# Patient Record
Sex: FEMALE | Race: WHITE | Employment: OTHER
[De-identification: names, ages, dates, MRNs, and addresses within clinical notes are randomized per-mention and may not be internally consistent; named-entity substitution may affect disease eponyms.]

---

## 2017-02-09 RX ORDER — GLUCOSAMINE SULFATE 1500 MG
1000 POWDER IN PACKET (EA) ORAL DAILY
COMMUNITY

## 2017-02-09 RX ORDER — FAMOTIDINE 20 MG/1
20 TABLET, FILM COATED ORAL 2 TIMES DAILY
COMMUNITY
End: 2017-07-12

## 2017-02-09 RX ORDER — LEVOTHYROXINE SODIUM 100 UG/1
100 TABLET ORAL
COMMUNITY

## 2017-02-09 RX ORDER — IBUPROFEN 200 MG
200 TABLET ORAL AS NEEDED
COMMUNITY
End: 2017-07-12

## 2017-02-09 RX ORDER — PREDNISONE 50 MG/1
60 TABLET ORAL DAILY
COMMUNITY
End: 2017-07-12

## 2017-02-09 RX ORDER — ASPIRIN 81 MG/1
81 TABLET ORAL DAILY
COMMUNITY
End: 2017-07-12 | Stop reason: SDUPTHER

## 2017-02-09 RX ORDER — AMLODIPINE BESYLATE 2.5 MG/1
2.5 TABLET ORAL DAILY
COMMUNITY
End: 2017-07-12

## 2017-02-09 RX ORDER — DM/PE/ACETAMINOPHEN/CHLORPHENR 10-5-325-2
TABLET, SEQUENTIAL ORAL
COMMUNITY

## 2017-02-10 ENCOUNTER — SURGERY (OUTPATIENT)
Age: 77
End: 2017-02-10

## 2017-02-10 ENCOUNTER — HOSPITAL ENCOUNTER (OUTPATIENT)
Age: 77
Setting detail: OUTPATIENT SURGERY
Discharge: HOME | End: 2017-02-10
Attending: SURGERY | Admitting: SURGERY
Payer: MEDICARE

## 2017-02-10 VITALS
OXYGEN SATURATION: 98 % | TEMPERATURE: 97.8 F | DIASTOLIC BLOOD PRESSURE: 80 MMHG | RESPIRATION RATE: 15 BRPM | HEART RATE: 84 BPM | BODY MASS INDEX: 22.18 KG/M2 | SYSTOLIC BLOOD PRESSURE: 144 MMHG | HEIGHT: 66 IN | WEIGHT: 138 LBS

## 2017-02-10 PROCEDURE — 74011000250 HC RX REV CODE- 250: Performed by: SURGERY

## 2017-02-10 PROCEDURE — 77030010507 HC ADH SKN DERMBND J&J -B: Performed by: SURGERY

## 2017-02-10 PROCEDURE — 77030002933 HC SUT MCRYL J&J -A: Performed by: SURGERY

## 2017-02-10 PROCEDURE — 76210000063 HC OR PH I REC FIRST 0.5 HR: Performed by: SURGERY

## 2017-02-10 PROCEDURE — 77030018836 HC SOL IRR NACL ICUM -A: Performed by: SURGERY

## 2017-02-10 PROCEDURE — 77030010938 HC CLP LIG TELE -A: Performed by: SURGERY

## 2017-02-10 PROCEDURE — 76210000020 HC REC RM PH II FIRST 0.5 HR: Performed by: SURGERY

## 2017-02-10 PROCEDURE — 77030011640 HC PAD GRND REM COVD -A: Performed by: SURGERY

## 2017-02-10 PROCEDURE — 77030011283 HC ELECTRD NDL COVD -A: Performed by: SURGERY

## 2017-02-10 PROCEDURE — 88305 TISSUE EXAM BY PATHOLOGIST: CPT | Performed by: SURGERY

## 2017-02-10 PROCEDURE — 76010000154 HC OR TIME FIRST 0.5 HR: Performed by: SURGERY

## 2017-02-10 RX ADMIN — LIDOCAINE HYDROCHLORIDE: 10 INJECTION, SOLUTION EPIDURAL; INFILTRATION; INTRACAUDAL; PERINEURAL at 08:31

## 2017-02-10 RX ADMIN — LIDOCAINE HYDROCHLORIDE 6 ML: 10 INJECTION, SOLUTION EPIDURAL; INFILTRATION; INTRACAUDAL; PERINEURAL at 09:05

## 2017-02-10 NOTE — IP AVS SNAPSHOT
Summary of Care Report The Summary of Care report has been created to help improve care coordination. Users with access to [employer] or [address] (Web-based application) may access additional patient information including the Discharge Summary. If you are not currently a [address] user and need more information, please call the number listed below in the [place] [address] section and ask to be connected with Medical Records. Facility Information Name Address Phone [address] [phone] Patient Information Patient Name Sex GRISELDA Ohara ([id number]) Female 1940 Discharge Information Admitting Provider Service Area Unit Elena Helton MD / [phone] [address] [place] [address] / [phone] Discharge Provider Discharge Date/Time Discharge Disposition Destination (none) 2/10/2017 09:30 (Pending) AHR (none) Patient Language Language ENGLISH [13] You are allergic to the following Allergen Reactions Ciprofloxacin Myalgia Quinolones Other (comments) EYES CAN'T FOCUS, PATIENT CANT WALK Current Discharge Medication List  
  
CONTINUE these medications which have NOT CHANGED Dose & Instructions Dispensing Information Comments ADVIL 200 mg tablet Generic drug:  ibuprofen Dose:  200 mg Take 200 mg by mouth as needed for Pain. Refills:  0  
   
 ASHWAGANDHA ROOT EXTRACT(BULK) Dose:  1 Cap 1 Cap by Does Not Apply route daily. 300MG DAILY Refills:  0  
   
 aspirin delayed-release 81 mg tablet Dose:  81 mg Take 81 mg by mouth daily. Refills:  0  
   
 L-Mfolate-B6 Phos-Methyl-B12 3-35-2 mg Tab tab Commonly known as:  Radha Akin Dose:  1 Tab Take 1 Tab by mouth daily. 1000MCG DAILY PO Refills:  0 NORVASC 2.5 mg tablet Generic drug:  amLODIPine Dose:  2.5 mg Take 2.5 mg by mouth daily. Refills:  0 Omega-3 Fatty Acids 60- mg Cpdr  
 Dose:  1 Tab Take 1 Tab by mouth daily. Refills:  0 PEPCID 20 mg tablet Generic drug:  famotidine Dose:  20 mg Take 20 mg by mouth two (2) times a day. Refills:  0  
   
 predniSONE 50 mg tablet Commonly known as:  Alinda Katherin Dose:  60 mg Take 60 mg by mouth daily. PER  [address]. [address] [place] [address], [address] [place] [address] Sylvia Helton Refills:  0 PRESERVISION AREDS 2 PO Take  by mouth. Refills:  0  
   
 SYNTHROID 100 mcg tablet Generic drug:  levothyroxine Dose:  100 mcg Take 100 mcg by mouth Daily (before breakfast). Refills:  0  
   
 VITAMIN D3 1,000 unit Cap Generic drug:  cholecalciferol Dose:  1000 Units Take 1,000 Units by mouth daily. Refills:  0 ASK your doctor about these medications Dose & Instructions Dispensing Information Comments GLUCOSAMINE RELIEF 1,000 mg Tab Generic drug:  glucosamine Take  by mouth. WITH CHONDROINTIN 1500MG DAILY Refills:  0 Surgery Information ID Date/Time Status Primary Surgeon All Procedures Location [id number] 2/10/2017 0830 Unposted Miguel Ángel Turcios MD LEFT TEMPORAL ARTERY BIOPSY [facility] MAIN OR Follow-up Information Follow up With Details Comments Contact Info Nat Morgan MD   [address] [address] 
[phone] Discharge Instructions Temporal Arteritis: Care Instructions Your Care Instructions Temporal arteritis is an inflammation of blood vessels leading to your head and eyes. It usually affects people older than 50. It is more common in women. This condition is also called giant cell arteritis. Temporal arteritis causes a dull, throbbing headache on one side of the head around the eye or near the temple.  Sometimes the pain feels like stabbing or burning. It may also cause jaw pain and vision loss. Temporal arteritis is treated right away to prevent blindness or stroke. Your doctor will prescribe steroids that you take as pills. The steroids can also be given to you through a needle in your vein. You should get better quickly, usually in 1 to 3 days. But you may need to take medicine for more than 2 years to prevent problems. Follow-up care is a key part of your treatment and safety. Be sure to make and go to all appointments, and call your doctor if you are having problems. It's also a good idea to know your test results and keep a list of the medicines you take. How can you care for yourself at home? · Take your medicines exactly as prescribed. Call your doctor if you have any problems with your medicine. · Take your steroid in the morning with food unless your doctor tells you otherwise. · Eat a balanced diet. · If you are on long-term steroids, take a daily vitamin containing calcium and vitamin D. This can prevent bone thinning caused by the steroids. · Get regular, gentle exercise to keep your bones strong and prevent bone loss. Walking is a good choice. Exercise can also help you cope with the illness. · Tell any health professional that cares for you that you are taking steroids. You may want to wear medical alert jewelry that lists this medicine. You can buy this at most drugstores. When should you call for help? Call 911 anytime you think you may need emergency care. For example, call if: 
· You have twitching, jerking, or a seizure. · You passed out (lost consciousness). · You have symptoms of a stroke. These may include: 
¨ Sudden numbness, tingling, weakness, or loss of movement in your face, arm, or leg, especially on only one side of your body. ¨ Sudden vision changes. ¨ Sudden trouble speaking. ¨ Sudden confusion or trouble understanding simple statements. ¨ Sudden problems with walking or balance. ¨ A sudden, severe headache that is different from past headaches. Call your doctor now or seek immediate medical care if: 
· You get new headaches. · You have nausea or heartburn. · You have side effects of your steroid medicine, such as: 
¨ Weight gain. ¨ Mood changes. ¨ Trouble sleeping. ¨ Bruising easily. Watch closely for changes in your health, and be sure to contact your doctor if: 
· You do not get better as expected. Where can you learn more? Go to http://yumiko-noah.info/. Enter Y286 in the search box to learn more about \"Temporal Arteritis: Care Instructions. \" Current as of: February 24, 2016 Content Version: 11.1 © 3460-2710 PublishThis, [employer]. Care instructions adapted under license by [employer] (which disclaims liability or warranty for this information). If you have questions about a medical condition or this instruction, always ask your healthcare professional. [place] [address] any warranty or liability for your use of this information. Chart Review Routing History No Routing History on File

## 2017-02-10 NOTE — OP NOTES
[address]   [address], [address]   OP NOTE       Name:  Shelby Treadwell   MR#:  [id number]   :  1940   Account #:  [de-identified]    Surgery Date:  02/10/2017   Date of Adm:  02/10/2017       PREOPERATIVE DIAGNOSIS: Rule out temporal arteritis. POSTOPERATIVE DIAGNOSIS: Rule out temporal arteritis. PROCEDURES PERFORMED: Left temporal artery biopsy. SURGEON: Esau Ogden. Jhony Barron MD.     SPECIMENS REMOVED: Left temporal artery. COMPLICATIONS: None. ANESTHESIA: Local with no sedation. ESTIMATED BLOOD LOSS: **15mls*    INDICATIONS: A 75-year-old woman with jaw pain and symptoms   consistent with possible temporal arteritis. It was opted to proceed with   left temporal artery biopsy. DESCRIPTION OF PROCEDURE: After informed consent, she was   taken to the procedure room. Her face was turned to the right. Her   area in front of the tragus of her ear was prepped and draped, local   was instilled after a thorough time-out. A small longitudinal incision was   made. The temporal artery was dissected free for 2 to 2.5 cm, doubly   clipped proximally and distally, and sent for pathologic examination. Hemostasis was obtained. Deep structures of 4-0 Monocryl, followed   by a running suture of 4-0 Monocryl was performed. Dermabond was   applied. The patient tolerated the procedure well and was taken to the   recovery room in satisfactory condition.         MD Mira Scott / Juan J.Albertina   D:  02/10/2017   09:16   T:  02/10/2017   10:36   Job #:  [id number]

## 2017-02-10 NOTE — DISCHARGE INSTRUCTIONS
Temporal Arteritis: Care Instructions  Your Care Instructions    Temporal arteritis is an inflammation of blood vessels leading to your head and eyes. It usually affects people older than 50. It is more common in women. This condition is also called giant cell arteritis. Temporal arteritis causes a dull, throbbing headache on one side of the head around the eye or near the temple. Sometimes the pain feels like stabbing or burning. It may also cause jaw pain and vision loss. Temporal arteritis is treated right away to prevent blindness or stroke. Your doctor will prescribe steroids that you take as pills. The steroids can also be given to you through a needle in your vein. You should get better quickly, usually in 1 to 3 days. But you may need to take medicine for more than 2 years to prevent problems. Follow-up care is a key part of your treatment and safety. Be sure to make and go to all appointments, and call your doctor if you are having problems. It's also a good idea to know your test results and keep a list of the medicines you take. How can you care for yourself at home? · Take your medicines exactly as prescribed. Call your doctor if you have any problems with your medicine. · Take your steroid in the morning with food unless your doctor tells you otherwise. · Eat a balanced diet. · If you are on long-term steroids, take a daily vitamin containing calcium and vitamin D. This can prevent bone thinning caused by the steroids. · Get regular, gentle exercise to keep your bones strong and prevent bone loss. Walking is a good choice. Exercise can also help you cope with the illness. · Tell any health professional that cares for you that you are taking steroids. You may want to wear medical alert jewelry that lists this medicine. You can buy this at most drugsSmall Demonses. When should you call for help? Call 911 anytime you think you may need emergency care.  For example, call if:  · You have twitching, jerking, or a seizure. · You passed out (lost consciousness). · You have symptoms of a stroke. These may include:  ¨ Sudden numbness, tingling, weakness, or loss of movement in your face, arm, or leg, especially on only one side of your body. ¨ Sudden vision changes. ¨ Sudden trouble speaking. ¨ Sudden confusion or trouble understanding simple statements. ¨ Sudden problems with walking or balance. ¨ A sudden, severe headache that is different from past headaches. Call your doctor now or seek immediate medical care if:  · You get new headaches. · You have nausea or heartburn. · You have side effects of your steroid medicine, such as:  ¨ Weight gain. ¨ Mood changes. ¨ Trouble sleeping. ¨ Bruising easily. Watch closely for changes in your health, and be sure to contact your doctor if:  · You do not get better as expected. Where can you learn more? Go to http://yumiko-noah.info/. Enter L449 in the search box to learn more about \"Temporal Arteritis: Care Instructions. \"  Current as of: February 24, 2016  Content Version: 11.1  © 6553-4321 Healthwise, [employer]. Care instructions adapted under license by [employer] (which disclaims liability or warranty for this information). If you have questions about a medical condition or this instruction, always ask your healthcare professional. [address] any warranty or liability for your use of this information.

## 2017-02-10 NOTE — IP AVS SNAPSHOT
Current Discharge Medication List  
  
Take these medications at their scheduled times Dose & Instructions Dispensing Information Comments Morning Noon Evening Bedtime ASHWAGANDHA ROOT EXTRACT(BULK) Your next dose is: Today, Tomorrow Other:  ____________ Dose:  1 Cap 1 Cap by Does Not Apply route daily. 300MG DAILY Refills:  0  
     
   
   
   
  
 aspirin delayed-release 81 mg tablet Your next dose is: Today, Tomorrow Other:  ____________ Dose:  81 mg Take 81 mg by mouth daily. Refills:  0  
     
   
   
   
  
 L-Mfolate-B6 Phos-Methyl-B12 3-35-2 mg Tab tab Commonly known as:  Pepper Majestic Your next dose is: Today, Tomorrow Other:  ____________ Dose:  1 Tab Take 1 Tab by mouth daily. 1000MCG DAILY PO Refills:  0 NORVASC 2.5 mg tablet Generic drug:  amLODIPine Your next dose is: Today, Tomorrow Other:  ____________ Dose:  2.5 mg Take 2.5 mg by mouth daily. Refills:  0 Omega-3 Fatty Acids 60- mg Cpdr  
   
Your next dose is: Today, Tomorrow Other:  ____________ Dose:  1 Tab Take 1 Tab by mouth daily. Refills:  0 PEPCID 20 mg tablet Generic drug:  famotidine Your next dose is: Today, Tomorrow Other:  ____________ Dose:  20 mg Take 20 mg by mouth two (2) times a day. Refills:  0  
     
   
   
   
  
 predniSONE 50 mg tablet Commonly known as:  Merle Vijay Your next dose is: Today, Tomorrow Other:  ____________ Dose:  60 mg Take 60 mg by mouth daily. PER  [address] [facility] Refills:  0  
     
   
   
   
  
 SYNTHROID 100 mcg tablet Generic drug:  levothyroxine Your next dose is: Today, Tomorrow Other:  ____________ Dose:  100 mcg Take 100 mcg by mouth Daily (before breakfast). Refills:  0  
     
   
   
   
  
 VITAMIN D3 1,000 unit Cap Generic drug:  cholecalciferol Your next dose is: Today, Tomorrow Other:  ____________ Dose:  1000 Units Take 1,000 Units by mouth daily. Refills:  0 Take these medications as needed Dose & Instructions Dispensing Information Comments Morning Noon Evening Bedtime ADVIL 200 mg tablet Generic drug:  ibuprofen Your next dose is: Today, Tomorrow Other:  ____________ Dose:  200 mg Take 200 mg by mouth as needed for Pain. Refills:  0 Take these medications as directed Dose & Instructions Dispensing Information Comments Morning Noon Evening Bedtime PRESERVISION AREDS 2 PO Your next dose is: Today, Tomorrow Other:  ____________ Take  by mouth. Refills:  0 ASK your doctor about these medications Dose & Instructions Dispensing Information Comments Morning Noon Evening Bedtime GLUCOSAMINE RELIEF 1,000 mg Tab Generic drug:  glucosamine Your next dose is: Today, Tomorrow Other:  ____________ Take  by mouth. WITH CHONDROINTIN 1500MG DAILY Refills:  0

## 2017-02-10 NOTE — ROUTINE PROCESS
Patient: Fran Marte MRN: [id number]  SSN: [id number]   YOB: 1940  Age: 76 y.o. Sex: female     Patient is status post Procedure(s):  LEFT TEMPORAL ARTERY BIOPSY.     Surgeon(s) and Role:     Brooke Valencia MD - Primary    Local/Dose/Irrigation:                                           Dressing/Packing:  Wound  Left-DRESSING TYPE: Topical skin adhesive/glue (02/10/17 0904)  Splint/Cast:  ]    Other:

## 2017-02-10 NOTE — IP AVS SNAPSHOT
[address] 
[phone] Patient: Mauricia Bumpers MRN: [id number] XJM:[birth year] You are allergic to the following Allergen Reactions Ciprofloxacin Myalgia Quinolones Other (comments) EYES CAN'T FOCUS, PATIENT CANT WALK Recent Documentation Height Weight BMI OB Status Smoking Status 1.664 m 62.6 kg 22.62 kg/m2 Postmenopausal Never Smoker Emergency Contacts Name Discharge Info Relation Home Work Mobile Milady Long DISCHARGE CAREGIVER [3] Daughter [21] [phone] Armin Rodriguez  Son [22] Brandon Holly  Daughter [21] Shashank Lee  Son [22] Олег Lee  Son [22] About your hospitalization You were admitted on:  February 10, 2017 [facility] You were discharged on:  February 10, 2017 Unit phone number:  [phone] Why you were hospitalized Your primary diagnosis was:  Not on File Providers Seen During Your Hospitalizations Provider Role Specialty Primary office phone Holly Cramer MD Attending Provider Vascular Surgery [phone] Your Primary Care Physician (PCP) Primary Care Physician Office Phone Office Fax [facility]is [facility] [phone] Follow-up Information Follow up With Details Comments Contact Info Shahid Reyes MD   [address] [place] [address] 
[phone] Current Discharge Medication List  
  
CONTINUE these medications which have NOT CHANGED Dose & Instructions Dispensing Information Comments Morning Noon Evening Bedtime ADVIL 200 mg tablet Generic drug:  ibuprofen Your next dose is: Today, Tomorrow Other:  _________ Dose:  200 mg Take 200 mg by mouth as needed for Pain. Refills:  0  
     
   
   
   
  
 ASHWAGANDHA ROOT EXTRACT(BULK) Your next dose is: Today, Tomorrow Other:  _________ Dose:  1 Cap 1 Cap by Does Not Apply route daily. 300MG DAILY Refills:  0  
     
   
   
   
  
 aspirin delayed-release 81 mg tablet Your next dose is: Today, Tomorrow Other:  _________ Dose:  81 mg Take 81 mg by mouth daily. Refills:  0  
     
   
   
   
  
 L-Mfolate-B6 Phos-Methyl-B12 3-35-2 mg Tab tab Commonly known as:  Merilynn Corti Your next dose is: Today, Tomorrow Other:  _________ Dose:  1 Tab Take 1 Tab by mouth daily. 1000MCG DAILY PO Refills:  0 NORVASC 2.5 mg tablet Generic drug:  amLODIPine Your next dose is: Today, Tomorrow Other:  _________ Dose:  2.5 mg Take 2.5 mg by mouth daily. Refills:  0 Omega-3 Fatty Acids 60- mg Cpdr  
   
Your next dose is: Today, Tomorrow Other:  _________ Dose:  1 Tab Take 1 Tab by mouth daily. Refills:  0 PEPCID 20 mg tablet Generic drug:  famotidine Your next dose is: Today, Tomorrow Other:  _________ Dose:  20 mg Take 20 mg by mouth two (2) times a day. Refills:  0  
     
   
   
   
  
 predniSONE 50 mg tablet Commonly known as:  Therman Rail Your next dose is: Today, Tomorrow Other:  _________ Dose:  60 mg Take 60 mg by mouth daily. PER  [address] [facility]. [address], [address] [facility] Refills:  0 PRESERVISION AREDS 2 PO Your next dose is: Today, Tomorrow Other:  _________ Take  by mouth. Refills:  0  
     
   
   
   
  
 SYNTHROID 100 mcg tablet Generic drug:  levothyroxine Your next dose is: Today, Tomorrow Other:  _________ Dose:  100 mcg Take 100 mcg by mouth Daily (before breakfast). Refills:  0  
     
   
   
   
  
 VITAMIN D3 1,000 unit Cap Generic drug:  cholecalciferol Your next dose is: Today, Tomorrow Other:  _________ Dose:  1000 Units Take 1,000 Units by mouth daily. Refills:  0 ASK your doctor about these medications Dose & Instructions Dispensing Information Comments Morning Noon Evening Bedtime GLUCOSAMINE RELIEF 1,000 mg Tab Generic drug:  glucosamine Your next dose is: Today, Tomorrow Other:  _________ Take  by mouth. WITH CHONDROINTIN 1500MG DAILY Refills:  0 Discharge Instructions Temporal Arteritis: Care Instructions Your Care Instructions Temporal arteritis is an inflammation of blood vessels leading to your head and eyes. It usually affects people older than 50. It is more common in women. This condition is also called giant cell arteritis. Temporal arteritis causes a dull, throbbing headache on one side of the head around the eye or near the temple. Sometimes the pain feels like stabbing or burning. It may also cause jaw pain and vision loss. Temporal arteritis is treated right away to prevent blindness or stroke. Your doctor will prescribe steroids that you take as pills. The steroids can also be given to you through a needle in your vein. You should get better quickly, usually in 1 to 3 days. But you may need to take medicine for more than 2 years to prevent problems. Follow-up care is a key part of your treatment and safety. Be sure to make and go to all appointments, and call your doctor if you are having problems. It's also a good idea to know your test results and keep a list of the medicines you take. How can you care for yourself at home? · Take your medicines exactly as prescribed. Call your doctor if you have any problems with your medicine. · Take your steroid in the morning with food unless your doctor tells you otherwise. · Eat a balanced diet. · If you are on long-term steroids, take a daily vitamin containing calcium and vitamin D. This can prevent bone thinning caused by the steroids. · Get regular, gentle exercise to keep your bones strong and prevent bone loss. Walking is a good choice. Exercise can also help you cope with the illness. · Tell any health professional that cares for you that you are taking steroids. You may want to wear medical alert jewelry that lists this medicine. You can buy this at most drugstores. When should you call for help? Call 911 anytime you think you may need emergency care. For example, call if: 
· You have twitching, jerking, or a seizure. · You passed out (lost consciousness). · You have symptoms of a stroke. These may include: 
¨ Sudden numbness, tingling, weakness, or loss of movement in your face, arm, or leg, especially on only one side of your body. ¨ Sudden vision changes. ¨ Sudden trouble speaking. ¨ Sudden confusion or trouble understanding simple statements. ¨ Sudden problems with walking or balance. ¨ A sudden, severe headache that is different from past headaches. Call your doctor now or seek immediate medical care if: 
· You get new headaches. · You have nausea or heartburn. · You have side effects of your steroid medicine, such as: 
¨ Weight gain. ¨ Mood changes. ¨ Trouble sleeping. ¨ Bruising easily. Watch closely for changes in your health, and be sure to contact your doctor if: 
· You do not get better as expected. Where can you learn more? Go to http://yumiko-noah.info/. Enter V923 in the search box to learn more about \"Temporal Arteritis: Care Instructions. \" Current as of: February 24, 2016 Content Version: 11.1 © 1282-8928 [employer]. Care instructions adapted under license by [employer] (which disclaims liability or warranty for this information).  If you have questions about a medical condition or this instruction, always ask your healthcare professional. [place] [address] any warranty or liability for your use of this information. Discharge Orders None Introducing [facility] SERVICES! [facility] introduces Emu Messenger patient portal. Now you can access parts of your medical record, email your doctor's office, and request medication refills online. 1. In your internet browser, go to [email or web address] 2. Click on the First Time User? Click Here link in the Sign In box. You will see the New Member Sign Up page. 3. Enter your Emu Messenger Access Code exactly as it appears below. You will not need to use this code after youve completed the sign-up process. If you do not sign up before the expiration date, you must request a new code. · Emu Messenger Access Code: [email or web address] Expires: 5/9/2017  8:48 AM 
 
4. Enter the last four digits of your Social Security Number (xxxx) and Date of Birth (mm/dd/yyyy) as indicated and click Submit. You will be taken to the next sign-up page. 5. Create a Emu Messenger ID. This will be your Emu Messenger login ID and cannot be changed, so think of one that is secure and easy to remember. 6. Create a Emu Messenger password. You can change your password at any time. 7. Enter your Password Reset Question and Answer. This can be used at a later time if you forget your password. 8. Enter your e-mail address. You will receive e-mail notification when new information is available in [address] 9. Click Sign Up. You can now view and download portions of your medical record. 10. Click the Download Summary menu link to download a portable copy of your medical information. If you have questions, please visit the Frequently Asked Questions section of the Emu Messenger website. Remember, Emu Messenger is NOT to be used for urgent needs. For medical emergencies, dial 911. Now available from your iPhone and Android! General Information Please provide this summary of care documentation to your next provider. Patient Signature:  ____________________________________________________________ Date:  ____________________________________________________________  
  
Sameera Adelia Provider Signature:  ____________________________________________________________ Date:  ____________________________________________________________

## 2017-02-10 NOTE — BRIEF OP NOTE
BRIEF OPERATIVE NOTE    Date of Procedure: 2/10/2017   Preoperative Diagnosis: VASCULAR HEADACHE, JAW PAIN  Postoperative Diagnosis: VASCULAR HEADACHE, JAW PAIN    Procedure(s):  LEFT TEMPORAL ARTERY BIOPSY  Surgeon(s) and Role:     Al Frausto MD - Primary            Surgical Staff:  Circ-1: Bethanie Sarabia  Scrub Tech-1: Jennifer Welch  Scrub RN-1: Zoe Bateman RN  Event Time In   Incision Start 1168   Incision Close 0909     Anesthesia: Local   Estimated Blood Loss: **10mls*  Specimens:   ID Type Source Tests Collected by Time Destination   1 : left temporal artery for biopsy Fresh Artery  Al Frausto MD 2/10/2017 9625 Pathology      Findings: *normal appearing artery   Complications: *none**  Implants: * No implants in log *

## 2017-07-12 ENCOUNTER — HOSPITAL ENCOUNTER (OUTPATIENT)
Dept: ULTRASOUND IMAGING | Age: 77
Discharge: HOME | End: 2017-07-12
Attending: INTERNAL MEDICINE
Payer: MEDICARE

## 2017-07-12 ENCOUNTER — OFFICE VISIT (OUTPATIENT)
Dept: ENDOCRINOLOGY | Age: 77
End: 2017-07-12

## 2017-07-12 VITALS
HEIGHT: 65 IN | BODY MASS INDEX: 23.46 KG/M2 | RESPIRATION RATE: 16 BRPM | OXYGEN SATURATION: 98 % | WEIGHT: 140.8 LBS | DIASTOLIC BLOOD PRESSURE: 97 MMHG | HEART RATE: 77 BPM | SYSTOLIC BLOOD PRESSURE: 146 MMHG

## 2017-07-12 DIAGNOSIS — L60.3 BRITTLE NAILS: ICD-10-CM

## 2017-07-12 DIAGNOSIS — E04.1 THYROID NODULE: ICD-10-CM

## 2017-07-12 DIAGNOSIS — E55.9 VITAMIN D DEFICIENCY: ICD-10-CM

## 2017-07-12 DIAGNOSIS — E03.9 ACQUIRED HYPOTHYROIDISM: ICD-10-CM

## 2017-07-12 DIAGNOSIS — E11.9 CONTROLLED TYPE 2 DIABETES MELLITUS WITHOUT COMPLICATION, WITHOUT LONG-TERM CURRENT USE OF INSULIN (HCC): ICD-10-CM

## 2017-07-12 DIAGNOSIS — T38.0X5A ADVERSE EFFECT OF GLUCOCORTICOIDS AND SYNTHETIC ANALOGUES, INITIAL ENCOUNTER: ICD-10-CM

## 2017-07-12 DIAGNOSIS — E04.1 THYROID NODULE: Primary | ICD-10-CM

## 2017-07-12 PROCEDURE — 76536 US EXAM OF HEAD AND NECK: CPT

## 2017-07-12 RX ORDER — AMLODIPINE BESYLATE 2.5 MG/1
TABLET ORAL
COMMUNITY
End: 2017-07-12 | Stop reason: SDUPTHER

## 2017-07-12 RX ORDER — DILTIAZEM HYDROCHLORIDE 120 MG/1
CAPSULE, COATED, EXTENDED RELEASE ORAL DAILY
COMMUNITY
End: 2020-02-12

## 2017-07-12 RX ORDER — GLUCOSAMINE HCL 500 MG
TABLET ORAL
COMMUNITY
End: 2017-07-12

## 2017-07-12 RX ORDER — CHOLECALCIFEROL (VITAMIN D3) 125 MCG
CAPSULE ORAL
COMMUNITY

## 2017-07-12 RX ORDER — GUAIFENESIN 100 MG/5ML
LIQUID (ML) ORAL
COMMUNITY
End: 2017-07-12

## 2017-07-12 RX ORDER — LANOLIN ALCOHOL/MO/W.PET/CERES
CREAM (GRAM) TOPICAL
COMMUNITY
End: 2017-07-12

## 2017-07-12 NOTE — MR AVS SNAPSHOT
Visit Information Date & Time Provider Department Dept. Phone Encounter #  
 7/12/2017  8:30 AM Yaneth Dawson MD [facility] [phone] [id number] Upcoming Health Maintenance Date Due DTaP/Tdap/Td series (1 - Tdap) 2/6/1961 ZOSTER VACCINE AGE 60> 2/6/1998 GLAUCOMA SCREENING Q2Y 2/6/2003 OSTEOPOROSIS SCREENING (DEXA) 2/6/2003 Pneumococcal 65+ Low/Medium Risk (1 of 2 - PCV13) 2/6/2003 MEDICARE YEARLY EXAM 2/6/2003 INFLUENZA AGE 9 TO ADULT 8/1/2017 Allergies as of 7/12/2017  Review Complete On: 7/12/2017 By: Yaneth Dawson MD  
  
 Severity Noted Reaction Type Reactions Ciprofloxacin  02/09/2017    Myalgia Quinolones  02/09/2017    Other (comments) EYES CAN'T FOCUS, PATIENT CANT WALK Current Immunizations  Never Reviewed No immunizations on file. Not reviewed this visit You Were Diagnosed With   
  
 Codes Comments Thyroid nodule    -  Primary ICD-10-CM: E04.1 ICD-9-CM: 241.0 Acquired hypothyroidism     ICD-10-CM: E03.9 ICD-9-CM: 244.9 Controlled type 2 diabetes mellitus without complication, without long-term current use of insulin ([facility] [address]     ICD-10-CM: E11.9 ICD-9-CM: 250.00 Adverse effect of glucocorticoids and synthetic analogues, initial encounter     ICD-10-CM: T38.0X5A 
ICD-9-CM: E932.0 Vitals BP Pulse Resp Height(growth percentile) Weight(growth percentile) SpO2  
 (!) 146/97 (BP 1 Location: Left arm, BP Patient Position: Sitting) 77 16 5' 5\" (1.651 m) 140 lb 12.8 oz (63.9 kg) 98% BMI OB Status Smoking Status 23.43 kg/m2 Postmenopausal Former Smoker Vitals History BMI and BSA Data Body Mass Index Body Surface Area  
 23.43 kg/m 2 1.71 m 2 Preferred Pharmacy Pharmacy Name Phone [facility], [address] [phone] Your Updated Medication List  
  
   
 This list is accurate as of: 7/12/17  9:23 AM.  Always use your most recent med list.  
  
  
  
  
 CARTIA  mg ER capsule Generic drug:  dilTIAZem CD Take  by mouth daily. ELIQUIS 5 mg tablet Generic drug:  apixaban Take 5 mg by mouth two (2) times a day. GLUCOSAMINE RELIEF 1,000 mg Tab Generic drug:  glucosamine Take  by mouth. WITH CHONDROINTIN 1500MG DAILY  
  
 mecobalamin (vitamin B12) 1,000 mcg Tbdi  
Place under the tongue. PRESERVISION AREDS 2 PO Take  by mouth. SYNTHROID 100 mcg tablet Generic drug:  levothyroxine Take 100 mcg by mouth Daily (before breakfast). VITAMIN D3 1,000 unit Cap Generic drug:  cholecalciferol Take 1,000 Units by mouth daily. We Performed the Following CORTISOL [id number] CPT(R)] HEMOGLOBIN A1C WITH EAG [12303 CPT(R)] T3, FREE [id number] CPT(R)] THYROID ANTIBODY PANEL [87662 CPT(R)] TSH AND FREE T4 [64032 CPT(R)] To-Do List   
 Around 07/12/2017 Imaging:  US THYROID/PARATHYROID/SOFT TISS   
  
 07/12/2017 11:30 AM  
(Arrive by 11:15 AM) Appointment with Jo-Ann George at [place] [address] ([phone]) GENERAL INSTRUCTIONS  1. Bring outside films ([employer]) pertaining to the affected area with you on the day of appointment. 2. A written order with a valid diagnosis and Physicians signature is required for all scheduled tests. 3. Check in at registration 30 minutes before your appointment time unless you were instructed to do otherwise. Please arrive 15 minutes prior to appointment to register. Patient Instructions I recommend researching a low-carbohydrate diet as this way of eating can help improve your blood sugars and also help you lose weight. It can also help decrease your needs for diabetes medications including insulin. Here are some resources you can use to educate yourself on the diet: 1. Diet Doctor Website: PickSeat.[employer] com/diabetes 
 https://darnell.org/ 2. A Low Carbohydrate, Ketogenic Diet Manual by Yvonne Tang 3. New Atkins for a New You by Yvonne Tang and Dwayne Martinez 4. http://Lockitron/blog/7-steps-to-healthy-low-carb-living/ 
 
================================================================== 
  
Moderate Low-Carb Diet AIM FOR LESS THAN 20 GRAMS OF NET CARBS PER MEAL Net carbs = total carbs (g) - fiber (g) Read food labels! Avoid food with added sugar or anything with more than 5g sugar per serving. Focus on eating mostly protein (meat, poultry, fish, shellfish, eggs), healthy fats (avocados, nuts, cheese, olive or coconut oil) and non-starchy vegetables (greens, carrots, tomatoes, bell peppers, broccoli, brussels sprouts, green beans, etc). If you fill yourself up with these foods, you won't even want the carbs. Minimize your fruit intake as much as possible, no more than one serving per day. When you do eat fruit, choose lower sugar options like fresh berries. 
  
BREAKFAST: whole eggs, veggies, omelet, plain yogurt, ritchie, sausage LUNCH: salad with meat/poultry, veggies, cheese, nuts; low carb wrap with veggies and meat DINNER: any type of meat/poultry or seafood (without breading), non-starchy vegetables 
  
GOOD LOW-CARB SNACK OPTIONS: string cheese, Babybel cheese, carrots and celery with Ranch dressing, nuts (almonds, pistachios, walnuts, etc), meat (snack size salami, ham, or turkey) BEVERAGES: water, water, water Other options: unsweet tea (okay to add a pack of Splenda if needed), sparkling water without calories (ex: [place], Finis Omari, etc), coffee (unsweeted creamer is fine) Introducing [facility] SERVICES! Brady Vieira introduces [employer] patient portal. Now you can access parts of your medical record, email your doctor's office, and request medication refills online. 1. In your internet browser, go to https://Siving Egil [place]. [employer]. [employer] 2. Click on the First Time User? Click Here link in the Sign In box. You will see the New Member Sign Up page. 3. Enter your SmartNews Access Code exactly as it appears below. You will not need to use this code after youve completed the sign-up process. If you do not sign up before the expiration date, you must request a new code. · SmartNews Access Code: [email or web address] Expires: 10/10/2017  9:21 AM 
 
4. Enter the last four digits of your Social Security Number (xxxx) and Date of Birth (mm/dd/yyyy) as indicated and click Submit. You will be taken to the next sign-up page. 5. Create a SmartNews ID. This will be your SmartNews login ID and cannot be changed, so think of one that is secure and easy to remember. 6. Create a SmartNews password. You can change your password at any time. 7. Enter your Password Reset Question and Answer. This can be used at a later time if you forget your password. 8. Enter your e-mail address. You will receive e-mail notification when new information is available in [address] 9. Click Sign Up. You can now view and download portions of your medical record. 10. Click the Download Summary menu link to download a portable copy of your medical information. If you have questions, please visit the Frequently Asked Questions section of the SmartNews website. Remember, SmartNews is NOT to be used for urgent needs. For medical emergencies, dial 911. Now available from your iPhone and Android! Please provide this summary of care documentation to your next provider. Your primary care clinician is listed as Bee Kirby. If you have any questions after today's visit, please call [phone].

## 2017-07-12 NOTE — PATIENT INSTRUCTIONS
I recommend researching a low-carbohydrate diet as this way of eating can help improve your blood sugars and also help you lose weight. It can also help decrease your needs for diabetes medications including insulin. Here are some resources you can use to educate yourself on the diet:  1. Diet Doctor Website:   Reamaze[employer]at[employer]ripMark/diabetes   https://Gear4music.com.ITM Solutions/  2. A Low Carbohydrate, Ketogenic Diet Manual by Charlotte Roberto  3. New Atkins for a New You by Charlotte Roberto and Kayleigh Turner  4. http://EasyQasa/blog/7-steps-to-healthy-low-carb-living/    ==================================================================     Moderate Low-Carb Diet  AIM FOR LESS THAN 20 GRAMS OF NET CARBS PER MEAL  Net carbs = total carbs (g) - fiber (g)  Read food labels! Avoid food with added sugar or anything with more than 5g sugar per serving. Focus on eating mostly protein (meat, poultry, fish, shellfish, eggs), healthy fats (avocados, nuts, cheese, olive or coconut oil) and non-starchy vegetables (greens, carrots, tomatoes, bell peppers, broccoli, brussels sprouts, green beans, etc). If you fill yourself up with these foods, you won't even want the carbs. Minimize your fruit intake as much as possible, no more than one serving per day.  When you do eat fruit, choose lower sugar options like fresh berries.     BREAKFAST: whole eggs, veggies, omelet, plain yogurt, ritchie, sausage   LUNCH: salad with meat/poultry, veggies, cheese, nuts; low carb wrap with veggies and meat  DINNER: any type of meat/poultry or seafood (without breading), non-starchy vegetables     GOOD LOW-CARB SNACK OPTIONS: string cheese, Babybel cheese, carrots and celery with Ranch dressing, nuts (almonds, pistachios, walnuts, etc), meat (snack size salami, ham, or turkey)    BEVERAGES: water, water, water  Other options: unsweet tea (okay to add a pack of Splenda if needed), sparkling water without calories (ex: [employer], Alan Hsu, etc), coffee (unsweeted creamer is fine)

## 2017-07-12 NOTE — PROGRESS NOTES
Endocrinology Visit    CC: thyroid nodules, hypothyroidism, prediabetes    Referring provider: Dr Shepard Skiff  Primary Care Provider: Carlito Fallon MD     History of present illness:  Andrew Reyes is an 76 y.o. female with history of PMR and a.fib who was referred for hypothyroidism, prediabetes, and h/o thyroid nodules. She was previously followed by an endocrinologist in [place]. I do not have any thyroid ultrasound reports available for review today but pt reports multiple small nodules. She has not had a thyroid biopsy. She reports intermittent thyroid pain and swelling but denies dysphagia, supine compression, or voice hoarseness. She wonders if she might have Hashimoto's. She does have a history of hypothyroidism, diagnosed about 50 years ago. She takes levothyroxine 100 mcg daily which has been her dose for years. Last TSH was 0.53 in Feb 2017. She was recently diagnosed with a.fib and started on CaCB and Eliquis. She denies racing heart or palpitations at present. Weight is stable. She denies heat or cold intolerance, changes in her bowel habits or energy level. Does notice her nails are thinning and have ridges in them. She has no family history of thyroid cancer. She also has a history of PMR and possible temporal arteritis. She was on prednisone for about six years and was weaned off. She was recently re-treated with steroids in February but this was weaned off gradually. She wonders if her adrenal glands are working properly due to the long-term steroid use. Denies hypotension, dizziness, lightheadedness, nausea, or vomiting. Dr Lita Vinson is following her for possible glucocorticoid-induced bone loss. She denies h/o fractures or osteoporosis and is not taking any bone-specific medications other than vitamin D. She does not eat dairy products or take a calcium supplement. She was diagnosed with prediabetes, last A1c was around 6% per her report.  Last BMP in Feb was notable for glucose of 209 but this was while she was taking prednisone. She is not taking any glucose-lowering medications but tries to avoid desserts and food with high amounts of carbs. Checks her blood sugar on occasion. Log from past three days shows fasting BGs: 133, 122, and 116. Afternoon BGs 124 and 118. Renal function was normal on her recent lab work but cholesterol is elevated: total 278, TG 50, HDL 88, and . She is not taking any cholesterol-lowering medication. ROS: see HPI for pertinent positives and negatives, otherwise a 10 system review is negative    Problem list:  Patient Active Problem List   Diagnosis Code    Hypertension I10    Neck pain on right side M54.2    Polymyalgia rheumatica (HCC) M35.3    Thyroid nodule E04.1    A-fib ([facility]) I48.91    Acquired hypothyroidism E03.9    Controlled type 2 diabetes mellitus without complication ([facility]) L64.8       Medical history:  Past Medical History:   Diagnosis Date    A-fib ([facility] [address]     Arthritis     OA    Diabetes ([facility] [address]     PRE DIABETIC    GERD (gastroesophageal reflux disease)     Hypertension     Ill-defined condition     MACULAR DEGENERATION    Polymyalgia (HCC)     Thyroid disease        Past surgical history:  Past Surgical History:   Procedure Laterality Date    HX APPENDECTOMY      HX BLEPHAROPLASTY      HX CATARACT REMOVAL Bilateral     HX DILATION AND CURETTAGE      HX ORTHOPAEDIC      RIGHT AND LEFT ARTHROSCOPIC SURGERY    HX ORTHOPAEDIC      RIGHT AND LEFT KNEE PARTIAL REPLACEMENTS JOINTS       Medications:    Current Outpatient Prescriptions:     mecobalamin, vitamin B12, 1,000 mcg TbDi, Place under the tongue., Disp: , Rfl:     dilTIAZem CD (CARTIA XT) 120 mg ER capsule, Take  by mouth daily. , Disp: , Rfl:     apixaban (ELIQUIS) 5 mg tablet, Take 5 mg by mouth two (2) times a day., Disp: , Rfl:     levothyroxine (SYNTHROID) 100 mcg tablet, Take 100 mcg by mouth Daily (before breakfast). , Disp: , Rfl:     VIT C/E/ZN/COPPR/LUTEIN/ZEAXAN (PRESERVISION AREDS 2 PO), Take  by mouth., Disp: , Rfl:     cholecalciferol (VITAMIN D3) 1,000 unit cap, Take 1,000 Units by mouth daily. , Disp: , Rfl:     glucosamine (GLUCOSAMINE RELIEF) 1,000 mg tab, Take  by mouth. WITH CHONDROINTIN 1500MG DAILY, Disp: , Rfl:     Allergies: Allergies   Allergen Reactions    Ciprofloxacin Myalgia    Quinolones Other (comments)     EYES CAN'T FOCUS, PATIENT CANT WALK        Social History:  Social History     Social History    Marital status: UNKNOWN     Spouse name: N/A    Number of children: N/A    Years of education: N/A     Occupational History    Not on file. Social History Main Topics    Smoking status: Former Smoker    Smokeless tobacco: Never Used    Alcohol use 0.6 oz/week     1 Glasses of wine per week      Comment: RARE    Drug use: No    Sexual activity: Not on file     Other Topics Concern    Not on file     Social History Narrative       Family History:  Family History   Problem Relation Age of Onset    Stroke Mother     Diabetes Mother     Migraines Mother     Cancer Father      PROSTATE CANCER AND COLON CANCER    Liver Disease Father      HAD BLOOD TRANSFUSION,  OF CIRRHOSIS OF LIVER, WAS NON DRINKER    Diabetes Brother     Anesth Problems Neg Hx        Physical Exam:  Visit Vitals    BP (!) 146/97 (BP 1 Location: Left arm, BP Patient Position: Sitting)    Pulse 77    Resp 16    Ht 5' 5\" (1.651 m)    Wt 140 lb 12.8 oz (63.9 kg)    SpO2 98%    BMI 23.43 kg/m2     Gen: NAD  Heent: mucous membranes moist, no lid lag, stare or exophthalmos. No scleral or conjunctival injection. Extra ocular muscles intact. Thyroid: moves well with swallowing, normal size, nodular but soft texture, no thyroid bruit  Heme/lymph: no cervical, supraclavicular or submandibular lymphadenopathy is appreciated.   Pulmonary: clear to auscultation bilaterally, no wheezes/rhonchi or rales  Cardiovascular: regular rate and rhythm, no murmurs, rubs or gallops, good distal pulses  Extremities: no clubbing, cyanosis or edema. Nails have ridges but no onocholysis or pitting  Neuro: no tremor of outstretched hands, reflexes are normal with normal relaxation phase  Skin: normal texture, warm and dry  Psyche: slightly tangential, good insight into her medical conditions    Pertinent lab review: There are no results available in [facility]. Pertinent lab results from outside records are transcribed in HPI and scanned into the medical record. Assessment and Plan:  Patient is a pleasant 76y.o. year old here for multiple endocrine issues as detailed below. 1. Thyroid nodules: small and stable in size per pt's report. Will update ultrasound today so I can review the images and advise her if f/u FNA is indicated. If the nodules are all less than 1cm in size, we discussed that annual ultrasonography does not need to be continued. 2. Acquired hypothyroidism: she is clinically euthyroid on current LT4 dose of 100 mcg daily. Will check TSH and FT4 today to ensure she is biochemically euthyroid. We discussed aiming for a TSH between 1-4 given her h/o afib and metabolic bone disease. Plan to adjust levothyroxine to achieve this target. Will also check thyroid antibodies to clarify the etiology of her hyperthyroidism. 3. Controlled type 2 diabetes mellitus without complication, without long-term current use of insulin: I suspect her A1c is in the prediabetes range now based on her home glucose readings. She likely had steroid-induced diabetes in the past. Update A1c today. We discussed possible therapies: treatment with low-dose metformin or a carbohydrate-restricted diet. She prefers to try diet first so I provided her with resources on low-carb dieting. 4. Adverse effect of glucocorticoids and synthetic analogues, initial encounter: >6 yr h/o glucocorticoid exposure, now off for the past 4-5 months.  Pt has no clinical evidence of adrenal insufficiency but would like further evaluation with a cortisol level. Check morning cortisol level with labs today. Will advise her if further testing is warranted based on this result. 5. Brittle nails: check ferritin and vitamin D      6. Vitamin D deficiency: check 25OHD to ensure levels are adequate for optimal bone health       I spent 60 minutes with the patient today and > 50% of the time was spent counseling the patient about thyroid nodules: their pathophysiology, diagnostic work-up, and management. She will return in 6 months or sooner if needed. Thank you for the opportunity to partake in this patients care. Lorena Mullen MD  [facility] Endocrin[facility]  [address] [facility]    ------------------------------------------------------------------------  Merged with [facility] 8/2/17:    Results sent to pt via letter a few weeks ago. Although the FT4 is barely above the reference range, TSH is where it needs to be and FT3 is normal. Advised she continue her current levothyroxine dose. Also advised she reduce dietary carbohydrate intake due to elevated A1c. If this is ineffective, will add metformin. US showed normal gland dimensions with very small nodules, largest of which is 6mm in size. Very benign appearing gland. I do not recommend a thyroid biopsy and she does not need to continue to have thyroid ultrasounds done.     Component      Latest Ref Rng & Units 7/12/2017 7/12/2017 7/12/2017           9:52 AM  9:52 AM  9:52 AM   TSH      0.450 - 4.500 uIU/mL   1.500   T4, Free      0.82 - 1.77 ng/dL   1.78 (H)   Thyroid peroxidase Ab      0 - 34 IU/mL 9     Thyroglobulin Ab      0.0 - 0.9 IU/mL <1.0     Triiodothyronine (T3), free      2.0 - 4.4 pg/mL  2.5      Component      Latest Ref Rng & Units 7/12/2017 7/12/2017 7/12/2017 7/12/2017           9:52 AM  9:52 AM  9:52 AM  9:52 AM   Hemoglobin A1c, (calculated)      4.8 - 5.6 %   6.3 (H)    Estimated average glucose      mg/dL   134    Cortisol, random      ug/dL 8. 9   VITAMIN D, 25-HYDROXY      30.0 - 100.0 ng/mL  47.4     Ferritin      15 - 150 ng/mL 257 (H)        Thyroid US 7/12/17  FINDINGS:  There is a 6 x 3 x 3 mm left thyroid nodule which appears partially cystic. No  dominant nodules are identified     The right lobe measures 4.5 x 0.8 x 1.5 cm and the left lobe measures 4.4 x 0.7  x 0.9 cm. The isthmus measures 2 mm.     IMPRESSION: No dominant nodules are identified.  There is a 6 x 3 mm nodule left  mid thyroid.

## 2017-07-13 LAB
25(OH)D3+25(OH)D2 SERPL-MCNC: 47.4 NG/ML (ref 30–100)
CORTIS SERPL-MCNC: 8.9 UG/DL
EST. AVERAGE GLUCOSE BLD GHB EST-MCNC: 134 MG/DL
FERRITIN SERPL-MCNC: 257 NG/ML (ref 15–150)
HBA1C MFR BLD: 6.3 % (ref 4.8–5.6)
T3FREE SERPL-MCNC: 2.5 PG/ML (ref 2–4.4)
T4 FREE SERPL-MCNC: 1.78 NG/DL (ref 0.82–1.77)
THYROGLOB AB SERPL-ACNC: <1 IU/ML (ref 0–0.9)
THYROPEROXIDASE AB SERPL-ACNC: 9 IU/ML (ref 0–34)
TSH SERPL DL<=0.005 MIU/L-ACNC: 1.5 UIU/ML (ref 0.45–4.5)

## 2017-07-13 NOTE — PROGRESS NOTES
Normal gland dimensions with very small nodules, largest of which is 6mm in size. Very benign appearing gland. I do not recommend a thyroid biopsy and she does not need to continue to have thyroid ultrasounds done. Lord Jara, will you let Mrs Medrano Sees know that her thyroid ultrasound looked fairly normal? Also, please remind [facility] today so I can send her the lab results and my comments. Thank you!

## 2019-01-10 ENCOUNTER — OFFICE VISIT (OUTPATIENT)
Dept: CARDIOLOGY CLINIC | Age: 79
End: 2019-01-10

## 2019-01-10 VITALS
OXYGEN SATURATION: 96 % | HEIGHT: 65 IN | WEIGHT: 145.6 LBS | RESPIRATION RATE: 14 BRPM | SYSTOLIC BLOOD PRESSURE: 160 MMHG | DIASTOLIC BLOOD PRESSURE: 86 MMHG | HEART RATE: 99 BPM | BODY MASS INDEX: 24.26 KG/M2

## 2019-01-10 DIAGNOSIS — M35.3 POLYMYALGIA RHEUMATICA (HCC): ICD-10-CM

## 2019-01-10 DIAGNOSIS — I10 ESSENTIAL HYPERTENSION: ICD-10-CM

## 2019-01-10 DIAGNOSIS — R53.83 FATIGUE, UNSPECIFIED TYPE: ICD-10-CM

## 2019-01-10 DIAGNOSIS — E03.9 ACQUIRED HYPOTHYROIDISM: ICD-10-CM

## 2019-01-10 DIAGNOSIS — E11.9 CONTROLLED TYPE 2 DIABETES MELLITUS WITHOUT COMPLICATION, UNSPECIFIED WHETHER LONG TERM INSULIN USE (HCC): ICD-10-CM

## 2019-01-10 DIAGNOSIS — R06.09 DOE (DYSPNEA ON EXERTION): ICD-10-CM

## 2019-01-10 DIAGNOSIS — I48.19 PERSISTENT ATRIAL FIBRILLATION (HCC): Primary | ICD-10-CM

## 2019-01-10 RX ORDER — DABIGATRAN ETEXILATE MESYLATE 150 MG/1
CAPSULE ORAL
COMMUNITY
Start: 2019-01-09 | End: 2019-01-11

## 2019-01-10 RX ORDER — PANTOPRAZOLE SODIUM 20 MG/1
TABLET, DELAYED RELEASE ORAL
COMMUNITY
Start: 2019-01-05

## 2019-01-10 RX ORDER — ZINC GLUCONATE 10 MG
LOZENGE ORAL
COMMUNITY
End: 2020-02-13

## 2019-01-10 RX ORDER — AMLODIPINE BESYLATE 2.5 MG/1
2.5 TABLET ORAL
COMMUNITY
End: 2020-02-12

## 2019-01-10 NOTE — PROGRESS NOTES
Wants to know how to mange her pain without taking motrin. Chief Complaint   Patient presents with    Irregular Heart Beat     1. Have you been to the ER, urgent care clinic since your last visit? Hospitalized since your last visit? Yes [facility] For shotness of breath     2. Have you seen or consulted any other health care providers outside of the [address] [place] [address] since your last visit? Include any pap smears or colon screening.  No

## 2019-01-11 ENCOUNTER — TELEPHONE (OUTPATIENT)
Dept: CARDIOLOGY CLINIC | Age: 79
End: 2019-01-11

## 2019-01-11 NOTE — PROGRESS NOTES
Cardiac Electrophysiology OFFICE Consultation Note     Subjective:      Joelene Goodpasture is a [de-identified] y.o. patient who is seen for evaluation of  Symptomatic long lasting persistent AFIB  She is kindly referred from Dr Elli Wesley  She is his mother in law  Dr Nic Salcedo wife is with her to this visit  She came up from [place] for this EP office visit  I am able to get records from Dr Shari Araujo of [facility]  Ms Adarsh Solis reported Marie Apo and fatigue with atrial fibrillation  She has arthralgia with 5 mg bid eliquis and GERD with pradaxa    Zio Patch monitor 1/8/2019 persistent AFIB average V rate 91 bpm, 100-120 bpm with activities, night time rate 60 bpm  Symptomatic with controlled ventricular rate    Echo 11/9/2018 LA volume 48 ml/m2  LVEF 55-65%  Mild AR  Moderate MR, RA moderately dilated, TR mild  MR moderate    PFT FEV 1 2.03 L FEV1/FVC 74%, DLCO/VA 4 (75% ref), 10/24/2017. She has been on amiodarone then    ECG AFIB/typical AFL 4/24/2018    Cardioverted 9/19/2017 which she said did not last long in NSR.   11/6/2017 she had JESSICA and CV documented no MARTELL thrombus, on 2.5 mg bid eliquis  200 J to sinus bradycardia 45-50 bpm    Coronary CT 2007 negative for CAD    Patient Active Problem List   Diagnosis Code    Hypertension I10    Neck pain on right side M54.2    Polymyalgia rheumatica ([facility]) M35.3    Thyroid nodule E04.1    A-fib ([facility]) I48.91    Acquired hypothyroidism E03.9    Controlled type 2 diabetes mellitus without complication ([facility]) V30.2     Current Outpatient Medications   Medication Sig Dispense Refill    PRADAXA 150 mg capsule       amLODIPine (NORVASC) 2.5 mg tablet Take 2.5 mg by mouth.  magnesium 250 mg tab Take  by mouth.  digestive enzymes cap Take  by mouth.  dietary supplement cap Take  by mouth.  CALCIUM-MAGNESIUM PO Take 600 mg by mouth.  mecobalamin, vitamin B12, 1,000 mcg TbDi Place under the tongue.       levothyroxine (SYNTHROID) 100 mcg tablet Take 100 mcg by mouth Daily (before breakfast).  VIT C/E/ZN/COPPR/LUTEIN/ZEAXAN (PRESERVISION AREDS 2 PO) Take  by mouth.  cholecalciferol (VITAMIN D3) 1,000 unit cap Take 1,000 Units by mouth daily.  glucosamine (GLUCOSAMINE RELIEF) 1,000 mg tab Take  by mouth. WITH CHONDROINTIN 1500MG DAILY      pantoprazole (PROTONIX) 20 mg tablet       dilTIAZem CD (CARTIA XT) 120 mg ER capsule Take  by mouth daily.  apixaban (ELIQUIS) 5 mg tablet Take 5 mg by mouth two (2) times a day. Allergies   Allergen Reactions    Ciprofloxacin Myalgia    Quinolones Other (comments)     EYES CAN'T FOCUS, PATIENT CANT WALK      Past Medical History:   Diagnosis Date    [facility]     Arthritis     OA    Diabetes ([facility]     PRE DIABETIC    GERD (gastroesophageal reflux disease)     Hypertension     Ill-defined condition     MACULAR DEGENERATION    Polymyalgia ([facility]     Thyroid disease      Past Surgical History:   Procedure Laterality Date    HX APPENDECTOMY      HX BLEPHAROPLASTY      HX CATARACT REMOVAL Bilateral     HX DILATION AND CURETTAGE      HX ORTHOPAEDIC      RIGHT AND LEFT ARTHROSCOPIC SURGERY    HX ORTHOPAEDIC      RIGHT AND LEFT KNEE PARTIAL REPLACEMENTS JOINTS     Family History   Problem Relation Age of Onset    Stroke Mother     Diabetes Mother     Migraines Mother     Cancer Father         PROSTATE CANCER AND COLON CANCER    Liver Disease Father         HAD BLOOD TRANSFUSION,  OF CIRRHOSIS OF LIVER, WAS NON DRINKER    Diabetes Brother     Anesth Problems Neg Hx      Social History     Tobacco Use    Smoking status: Former Smoker    Smokeless tobacco: Never Used   Substance Use Topics    Alcohol use: Yes     Alcohol/week: 0.6 oz     Types: 1 Glasses of wine per week     Comment: RARE        Review of Systems:   Constitutional: Negative for fever, chills, weight loss, + malaise/fatigue. HEENT: Negative for nosebleeds, vision changes.    Respiratory: Negative for cough, hemoptysis  Cardiovascular: Negative for chest pain, palpitations, orthopnea, claudication, leg swelling, syncope, and PND. +WILKINS  Gastrointestinal: Negative for nausea, vomiting, diarrhea, blood in stool and melena. + GERD  Genitourinary: Negative for dysuria, and hematuria. Musculoskeletal: + for myalgias, arthralgia. Skin: Negative for rash. Heme:  bruise easily. Neurological: Negative for speech change and focal weakness     Objective:     Visit Vitals  /86 (BP 1 Location: Right arm, BP Patient Position: Sitting)   Pulse 99   Resp 14   Ht 5' 5\" (1.651 m)   Wt 145 lb 9.6 oz (66 kg)   SpO2 96%   BMI 24.23 kg/m²      Physical Exam:   Constitutional: well-developed and well-nourished. No respiratory distress. Head: Normocephalic and atraumatic. Eyes: Pupils are equal, round  ENT: hearing normal  Neck: supple. No JVD present. Cardiovascular: Normal rate, irregular rhythm. Exam reveals no gallop and no friction rub. No murmur heard. Pulmonary/Chest: Effort normal and breath sounds normal. No wheezes. Abdominal: Soft, no tenderness. Musculoskeletal: no edema. Neurological: alert, oriented. Skin: Skin is warm and dry  Psychiatric: normal mood and affect. Behavior is normal. Judgment and thought content normal.        Assessment/Plan:       ICD-10-CM ICD-9-CM    1. Persistent atrial fibrillation (HCC) I48.1 427.31    2. Essential hypertension I10 401.9    3. Acquired hypothyroidism E03.9 244.9    4. Controlled type 2 diabetes mellitus without complication, unspecified whether long term insulin use (HCC) E11.9 250.00    5. Polymyalgia rheumatica (HCC) M35.3 725    6. WILKINS (dyspnea on exertion) R06.09 786.09    7.  Fatigue, unspecified type R53.83 780.79      reviewed diet, exercise and weight control  reviewed medications and side effects in detail   She thinks atrial fibrillation (persistent 1.5 years) caused her fatigue and WILKINS despite ventricular rate control with cardizem  BP has been high so norvasc added  I assumed that beta blocker is avoided due to fatigue  She has arthralgia with full dose eliquis so had used 1/2 dose and then pradaxa but due to GERD, she does not like it  I do not have her baseline renal function but she was in ER recently so they should have it  She is agreeable to try xarelto 20 mg with dinner  However she is also having arthritis and wants NSAIDS as tylenol is not enough. If so I recommend celebrex and she should also use protonix with this (for the GERD benefit as well) she will talk to PCP. She used to have rheumatologist but he has retired. She has PMR so she could see rheumatologist here in [place]    As for her symptomatic persistent atrial fibrillation/ECG also suggested coexisting typical atrial flutter, she can consider to repeat cardioversion with loading dose of amiodarone  She cannot remember if she had been on it. Records mentioned it but I am not sure for how long. There was discussion of using Tikosyn and cardioversion again but 3 days hospital admission is difficult for her. If she wants to know for sure how atrial fibrillation affects her (because at the end of today visit, she appears to be uncertain that it affects her much at this time; she thinks it has been better), I would recommend her try amiodarone again and take full dose xarelto, consider electrical cardioversion and if she maintains NSR and feels better then she can consider hybrid ablation with me and Dr Rudolph Sorenson, repair mitral valve, clip left atrial appendage. In that case she can see Dr Rudolph Sorenson for opinion as well. I would do endocardial ablation after his epicardial ablation   If she is not concerned about the invasiveness of this approach then she can consider single lead pacer and av node ablation. Her ventricular rate is controlled with cardizem so she can benefit from the regular RV pacing but may not resolve all of her symptoms and she still has to use xarelto.   She may close LA appendage later if she cannot tolerate any NOAC (she does not want coumadin)  She does not like the size of transvenous pacer system so in that case I have shown her leadless pacer     She and her daughter Stefany Archer and Dr Judith Peoples will discuss further at home and she will let me know how to proceed  Her BP needs more control if this is not just elevated due to being in office today (change norvasc to ACEI or ARB-she is taking both norvasc and cardizem)      Thank you for involving me in this patient's care and please call with further concerns or questions. Aliza Cooley M.D.   Electrophysiology/Cardiology  [facility]  [facility] [address] [facility], [address], [address] [place] [address]  [phone]

## 2019-01-11 NOTE — TELEPHONE ENCOUNTER
Came in today to  samples. Gave xarelto samples. Had patient signed release. Donell Staples MR release to [facility] ED at fax number [phone]. Fax confirmation received.

## 2019-01-18 ENCOUNTER — DOCUMENTATION ONLY (OUTPATIENT)
Dept: CARDIOLOGY CLINIC | Age: 79
End: 2019-01-18

## 2019-01-18 NOTE — PROGRESS NOTES
Received records from [facility] dated 10/15/2018. Pt reported WILKINS x 4 mos, subjective high BP. Regularly irregular rhythm on exam.      Labs include:  CBC WNL with exception of RDW 15.    PT 15.7, INR 1.27. CMP:   Na 129  K 3.6  Cr 0.7  BUN 8    Troponin neg        Free T4 1.66      CXR without acute process. UA negative. AF with RVR noted (106 bpm).

## 2019-01-28 ENCOUNTER — TELEPHONE (OUTPATIENT)
Dept: CARDIOLOGY CLINIC | Age: 79
End: 2019-01-28

## 2019-01-28 NOTE — TELEPHONE ENCOUNTER
Two patient indentifiers verified. Pt sent my chart message but wanted a phone call. Pt stated she has been having frequent urination and elevated Bp. Pt stated BP was 153/110 and 154/109 per that patient. Pt was informed that Xarelto should not be causing these symptoms but she should f/u with PCP about BP and frequent urination. Pt verbalized understanding and denies any further questions at this time.

## 2019-01-28 NOTE — TELEPHONE ENCOUNTER
Regarding: FW:Svitlana After Visit Follow-Up Message. Contact: [phone]      ----- Message -----  From: Lynn Velazquez  Sent: 1/28/2019  10:53 AM  To: Latonia Pepper  Subject: Mike Guerrero After Visit Follow-Up Message. ----- Message from [address] [place] [address] 95, [facility] sent at 1/28/2019 10:53 AM EST -----    May I speak to Dr. Danay Conroy nurse? I am Lynn Velazquez in [place]. My birthdate is [birth year] and my phone no. in [phone]. Thank you.      ----- Message -----  From: Tessa Bailey MD  Sent: 1/13/2019 12:00 AM EST  To: Lynn Velazquez  Subject: Svitlana After Visit Follow-Up Message. Andreas Ms. Nimo Vásquezmanny you for your recent visit to [facility]. Your health is important to us and we are privileged to be your healthcare provider and partner. If you have follow-up questions regarding your treatment plan from your recent visit, please contact us through the "[employer] Patient Portal by replying to this message. In the near future, you may receive a survey about your experience with us. We hope you will take the time to let us know how we did so we can continue to improve the care you receive.       Thank you,    [facility]

## 2019-02-15 ENCOUNTER — TELEPHONE (OUTPATIENT)
Dept: CARDIOLOGY CLINIC | Age: 79
End: 2019-02-15

## 2019-02-15 NOTE — TELEPHONE ENCOUNTER
Rima Turcios called from Gastroentology stating the pt stated that it was okay per Dr. Chiquita Parson for her to hold her Xarelto for 3 days for her procedure on 2/19/19. She's   Phone calling to f/u on this and receive an ok. She also stated she will need to know today because the pt will have to stop starting tomorrow.    Phone #[phone] option 5  Thanks

## 2019-02-15 NOTE — TELEPHONE ENCOUNTER
We typically recommend holding NOAC x 2 days prior to procedure, but ok to hold x 3 days if that's what is preferred by GI physician. Resume as soon as cleared to do so.

## 2019-02-15 NOTE — TELEPHONE ENCOUNTER
Robert Wright from Gastroenterology office called requesting a call back today regarding patient medication to hold, due to scheduled procedure on tuesday.   Phone # [phone] ext [phone]  Thanks

## 2019-04-05 RX ORDER — RIVAROXABAN 20 MG/1
TABLET, FILM COATED ORAL
Qty: 30 TAB | Refills: 5 | Status: SHIPPED | OUTPATIENT
Start: 2019-04-05 | End: 2020-02-12

## 2019-04-05 NOTE — TELEPHONE ENCOUNTER
Requested Prescriptions     Signed Prescriptions Disp Refills    XARELTO 20 mg tab tablet 30 Tab 5     Sig: TAKE ONE TABLET BY MOUTH DAILY     Authorizing Provider: DELANEY QUILES     Ordering User: Patricia Pulido       Per Dr. Juliette Isabel     No future appointments.

## 2020-02-05 ENCOUNTER — HOSPITAL ENCOUNTER (EMERGENCY)
Age: 80
Discharge: HOME | End: 2020-02-05
Attending: EMERGENCY MEDICINE
Payer: MEDICARE

## 2020-02-05 VITALS
OXYGEN SATURATION: 97 % | DIASTOLIC BLOOD PRESSURE: 72 MMHG | HEART RATE: 76 BPM | TEMPERATURE: 97.5 F | SYSTOLIC BLOOD PRESSURE: 117 MMHG | RESPIRATION RATE: 16 BRPM

## 2020-02-05 DIAGNOSIS — R55 NEAR SYNCOPE: Primary | ICD-10-CM

## 2020-02-05 LAB
ALBUMIN SERPL-MCNC: 3.8 G/DL (ref 3.5–5)
ALBUMIN/GLOB SERPL: 1.1 {RATIO} (ref 1.1–2.2)
ALP SERPL-CCNC: 164 U/L (ref 45–117)
ALT SERPL-CCNC: 51 U/L (ref 12–78)
ANION GAP SERPL CALC-SCNC: 5 MMOL/L (ref 5–15)
APPEARANCE UR: ABNORMAL
AST SERPL-CCNC: 61 U/L (ref 15–37)
ATRIAL RATE: 67 BPM
BACTERIA URNS QL MICRO: NEGATIVE /HPF
BASOPHILS # BLD: 0.1 K/UL (ref 0–0.1)
BASOPHILS NFR BLD: 1 % (ref 0–1)
BILIRUB SERPL-MCNC: 0.7 MG/DL (ref 0.2–1)
BILIRUB UR QL CFM: NEGATIVE
BNP SERPL-MCNC: 675 PG/ML
BUN SERPL-MCNC: 23 MG/DL (ref 6–20)
BUN/CREAT SERPL: 28 (ref 12–20)
CALCIUM SERPL-MCNC: 9.7 MG/DL (ref 8.5–10.1)
CALCULATED R AXIS, ECG10: -90 DEGREES
CALCULATED T AXIS, ECG11: 76 DEGREES
CHLORIDE SERPL-SCNC: 99 MMOL/L (ref 97–108)
CO2 SERPL-SCNC: 25 MMOL/L (ref 21–32)
COLOR UR: ABNORMAL
COMMENT, HOLDF: NORMAL
CREAT SERPL-MCNC: 0.83 MG/DL (ref 0.55–1.02)
DIAGNOSIS, 93000: NORMAL
DIFFERENTIAL METHOD BLD: ABNORMAL
EOSINOPHIL # BLD: 0 K/UL (ref 0–0.4)
EOSINOPHIL NFR BLD: 0 % (ref 0–7)
EPITH CASTS URNS QL MICRO: ABNORMAL /LPF
ERYTHROCYTE [DISTWIDTH] IN BLOOD BY AUTOMATED COUNT: 19 % (ref 11.5–14.5)
GLOBULIN SER CALC-MCNC: 3.4 G/DL (ref 2–4)
GLUCOSE SERPL-MCNC: 175 MG/DL (ref 65–100)
GLUCOSE UR STRIP.AUTO-MCNC: NEGATIVE MG/DL
HCT VFR BLD AUTO: 40.2 % (ref 35–47)
HGB BLD-MCNC: 12.6 G/DL (ref 11.5–16)
HGB UR QL STRIP: NEGATIVE
HYALINE CASTS URNS QL MICRO: ABNORMAL /LPF (ref 0–5)
IMM GRANULOCYTES # BLD AUTO: 0 K/UL (ref 0–0.04)
IMM GRANULOCYTES NFR BLD AUTO: 1 % (ref 0–0.5)
KETONES UR QL STRIP.AUTO: NEGATIVE MG/DL
LEUKOCYTE ESTERASE UR QL STRIP.AUTO: NEGATIVE
LYMPHOCYTES # BLD: 1.1 K/UL (ref 0.8–3.5)
LYMPHOCYTES NFR BLD: 13 % (ref 12–49)
MCH RBC QN AUTO: 28.6 PG (ref 26–34)
MCHC RBC AUTO-ENTMCNC: 31.3 G/DL (ref 30–36.5)
MCV RBC AUTO: 91.2 FL (ref 80–99)
MONOCYTES # BLD: 0.6 K/UL (ref 0–1)
MONOCYTES NFR BLD: 7 % (ref 5–13)
NEUTS SEG # BLD: 6.7 K/UL (ref 1.8–8)
NEUTS SEG NFR BLD: 78 % (ref 32–75)
NITRITE UR QL STRIP.AUTO: NEGATIVE
NRBC # BLD: 0 K/UL (ref 0–0.01)
NRBC BLD-RTO: 0 PER 100 WBC
PH UR STRIP: 5 [PH] (ref 5–8)
PLATELET # BLD AUTO: 182 K/UL (ref 150–400)
PMV BLD AUTO: 10.5 FL (ref 8.9–12.9)
POTASSIUM SERPL-SCNC: 4.6 MMOL/L (ref 3.5–5.1)
PROT SERPL-MCNC: 7.2 G/DL (ref 6.4–8.2)
PROT UR STRIP-MCNC: 30 MG/DL
Q-T INTERVAL, ECG07: 430 MS
QRS DURATION, ECG06: 160 MS
QTC CALCULATION (BEZET), ECG08: 483 MS
RBC # BLD AUTO: 4.41 M/UL (ref 3.8–5.2)
RBC #/AREA URNS HPF: ABNORMAL /HPF (ref 0–5)
SAMPLES BEING HELD,HOLD: NORMAL
SODIUM SERPL-SCNC: 129 MMOL/L (ref 136–145)
SP GR UR REFRACTOMETRY: 1.03 (ref 1–1.03)
TROPONIN I SERPL-MCNC: <0.05 NG/ML
TSH SERPL DL<=0.05 MIU/L-ACNC: 1.52 UIU/ML (ref 0.36–3.74)
UR CULT HOLD, URHOLD: NORMAL
UROBILINOGEN UR QL STRIP.AUTO: 1 EU/DL (ref 0.2–1)
VENTRICULAR RATE, ECG03: 76 BPM
WBC # BLD AUTO: 8.6 K/UL (ref 3.6–11)
WBC URNS QL MICRO: ABNORMAL /HPF (ref 0–4)

## 2020-02-05 PROCEDURE — 85025 COMPLETE CBC W/AUTO DIFF WBC: CPT

## 2020-02-05 PROCEDURE — 93005 ELECTROCARDIOGRAM TRACING: CPT

## 2020-02-05 PROCEDURE — 74011250636 HC RX REV CODE- 250/636: Performed by: EMERGENCY MEDICINE

## 2020-02-05 PROCEDURE — 80053 COMPREHEN METABOLIC PANEL: CPT

## 2020-02-05 PROCEDURE — 74011000250 HC RX REV CODE- 250: Performed by: SPECIALIST

## 2020-02-05 PROCEDURE — 83880 ASSAY OF NATRIURETIC PEPTIDE: CPT

## 2020-02-05 PROCEDURE — 36415 COLL VENOUS BLD VENIPUNCTURE: CPT

## 2020-02-05 PROCEDURE — 74011250637 HC RX REV CODE- 250/637: Performed by: EMERGENCY MEDICINE

## 2020-02-05 PROCEDURE — 74011250637 HC RX REV CODE- 250/637: Performed by: SPECIALIST

## 2020-02-05 PROCEDURE — 84443 ASSAY THYROID STIM HORMONE: CPT

## 2020-02-05 PROCEDURE — 84484 ASSAY OF TROPONIN QUANT: CPT

## 2020-02-05 PROCEDURE — 99285 EMERGENCY DEPT VISIT HI MDM: CPT

## 2020-02-05 PROCEDURE — 81001 URINALYSIS AUTO W/SCOPE: CPT

## 2020-02-05 RX ORDER — ACETAMINOPHEN 500 MG
1000 TABLET ORAL ONCE
Status: COMPLETED | OUTPATIENT
Start: 2020-02-05 | End: 2020-02-05

## 2020-02-05 RX ORDER — FAMOTIDINE 20 MG/1
20 TABLET, FILM COATED ORAL
Status: COMPLETED | OUTPATIENT
Start: 2020-02-05 | End: 2020-02-05

## 2020-02-05 RX ADMIN — SODIUM CHLORIDE 250 ML: 900 INJECTION, SOLUTION INTRAVENOUS at 18:47

## 2020-02-05 RX ADMIN — ACETAMINOPHEN 1000 MG: 500 TABLET ORAL at 18:45

## 2020-02-05 RX ADMIN — LIDOCAINE HYDROCHLORIDE 40 ML: 20 SOLUTION ORAL; TOPICAL at 18:46

## 2020-02-05 RX ADMIN — FAMOTIDINE 20 MG: 20 TABLET ORAL at 18:45

## 2020-02-05 NOTE — ED PROVIDER NOTES
78 y.o. female with past medical history significant for diabetes, HTN, and a-fib who presents from [address] [place] [address] [place] via EMS with chief complaint of chest pain. Patient has been experiencing chest pain, neck pain, and a sore throat for ~4 days. Patient states today she had onset of back pain and while she was at her Opthalmologist earlier she became lightheaded, pale, and vomited once. Patients daughter states patient is back to her \"regular skin color\" now. Patient notes she did have a pacemaker placed ~2 months ago and ~1 month ago (12/23/19) she had an ablation. Patient presents to [facility] ED c/o chest pain described as \"it hurts to breath. \" Patient c/o persisting neck pain, back pain, and sore throat. Patient reports her lightheadedness is resolved and she is not nauseated. Patient's daughter reports patient recently had a UTI and was treated with Penicillin. Patient denies recent sick contacts. Patient denies fever, chills, cough, and abdominal pain. There are no other acute medical concerns at this time. Social hx: Former Smoker, (+) EtOH use, No illicit drug use. PCP: Cathy Swanson MD    Note written by Zane Green, as dictated by Jorge Luis Contreras MD 4:00 PM     The history is provided by the patient and a relative. No  was used.         Past Medical History:   Diagnosis Date    [facility] ([facility]     Arthritis     OA    Diabetes ([facility]     PRE DIABETIC    GERD (gastroesophageal reflux disease)     Hypertension     Ill-defined condition     MACULAR DEGENERATION    Polymyalgia (HCC)     Thyroid disease        Past Surgical History:   Procedure Laterality Date    HX APPENDECTOMY      HX BLEPHAROPLASTY      HX CATARACT REMOVAL Bilateral     HX DILATION AND CURETTAGE      HX ORTHOPAEDIC      RIGHT AND LEFT ARTHROSCOPIC SURGERY    HX ORTHOPAEDIC      RIGHT AND LEFT KNEE PARTIAL REPLACEMENTS JOINTS         Family History:   Problem Relation Age of Onset    Stroke Mother  Diabetes Mother     Migraines Mother     Cancer Father         PROSTATE CANCER AND COLON CANCER    Liver Disease Father         HAD BLOOD TRANSFUSION,  OF CIRRHOSIS OF LIVER, WAS NON DRINKER    Diabetes Brother     Anesth Problems Neg Hx        Social History     Socioeconomic History    Marital status:      Spouse name: Not on file    Number of children: Not on file    Years of education: Not on file    Highest education level: Not on file   Occupational History    Not on file   Social Needs    Financial resource strain: Not on file    Food insecurity:     Worry: Not on file     Inability: Not on file    Transportation needs:     Medical: Not on file     Non-medical: Not on file   Tobacco Use    Smoking status: Former Smoker    Smokeless tobacco: Never Used   Substance and Sexual Activity    Alcohol use: Yes     Alcohol/week: 1.0 standard drinks     Types: 1 Glasses of wine per week     Comment: RARE    Drug use: No    Sexual activity: Not on file   Lifestyle    Physical activity:     Days per week: Not on file     Minutes per session: Not on file    Stress: Not on file   Relationships    Social connections:     Talks on phone: Not on file     Gets together: Not on file     Attends Druze service: Not on file     Active member of club or organization: Not on file     Attends meetings of clubs or organizations: Not on file     Relationship status: Not on file    Intimate partner violence:     Fear of current or ex partner: Not on file     Emotionally abused: Not on file     Physically abused: Not on file     Forced sexual activity: Not on file   Other Topics Concern    Not on file   Social History Narrative    Not on file         ALLERGIES: Ciprofloxacin and Quinolones    Review of Systems   Constitutional: Negative for chills and fever. HENT: Positive for sore throat. Respiratory: Negative for cough and shortness of breath. Cardiovascular: Positive for chest pain. Gastrointestinal: Negative for abdominal pain, constipation, diarrhea and vomiting. Musculoskeletal: Positive for back pain and neck pain. Neurological: Negative for dizziness and light-headedness. All other systems reviewed and are negative. Vitals:    02/05/20 1526   BP: 116/77   Pulse: 78   Resp: 16   Temp: 97.5 °F (36.4 °C)   SpO2: 100%            Physical Exam  Vitals signs and nursing note reviewed. Constitutional:       General: She is not in acute distress. Appearance: She is well-developed. She is not ill-appearing, toxic-appearing or diaphoretic. HENT:      Head: Normocephalic. Eyes:      Pupils: Pupils are equal, round, and reactive to light. Neck:      Musculoskeletal: Normal range of motion and neck supple. Cardiovascular:      Rate and Rhythm: Normal rate and regular rhythm. Pulses: Normal pulses. Heart sounds: Normal heart sounds. No murmur. Pulmonary:      Effort: Pulmonary effort is normal. No respiratory distress. Breath sounds: Normal breath sounds. No wheezing or rales. Abdominal:      General: There is no distension. Palpations: Abdomen is soft. Tenderness: There is no abdominal tenderness. Musculoskeletal: Normal range of motion. General: No swelling or tenderness. Skin:     General: Skin is warm and dry. Capillary Refill: Capillary refill takes less than 2 seconds. Neurological:      Mental Status: She is alert and oriented to person, place, and time. Psychiatric:         Behavior: Behavior normal.     Note written by Zane Robles, as dictated by Deetta Primrose, MD 4:00 PM      MDM  Number of Diagnoses or Management Options  Near syncope:   Diagnosis management comments: The patient is resting comfortably and feels better, is alert, talkative, interactive and in no distress. The repeat examination is unremarkable and benign.  The patient is neurologically intact, has a normal mental status and is ambulatory in the ED. The history, exam, diagnostic testing (if any) and the patient's current condition do not suggest arrhythmia, STEMI, seizure, meningitis, stroke, sepsis, subarachnoid hemorrhage, intracranial bleeding, encephalitis or other significant pathology that would warrant further testing, continued ED treatment, admission, neurological consultation, or other specialist evaluation at this point. The vital signs have been stable. The patient's condition is stable and appropriate for discharge. The patient will pursue further outpatient evaluation with the primary care physician or other designated or consulting physician as indicated in the discharge instructions. Procedures  ED EKG interpretation:  Rhythm: ventricular paced; and regular . Rate (approx.): 76 bpm; 100% capture. No ectopy. Note written by Zane Childers, as dictated by Jessy Suarez MD 4:00 PM     PROGRESS NOTE:  5:46 PM  Pt's sodium today is 129, pt's sodium in September of last year (~5 months ago) was 133.    6:26 PM  Dr. Suzanne Ewing with cardiology thinks is pt's interrogation comes back unremarkable, pt can be discharged home and f/u as an outpatient. 6:28 PM  Pt had no episodes of tachycardia, no episodes to report, normal [employer] report.

## 2020-02-05 NOTE — ED TRIAGE NOTES
Arrives via EMS from eye doctor appt for worsening sore throat, neck pain, chest pain x 3-4 days. Bilateral flank/back pain that started today. +vomiting at eye office. Pt had her eyes dilated.

## 2020-02-08 ENCOUNTER — APPOINTMENT (OUTPATIENT)
Dept: GENERAL RADIOLOGY | Age: 80
DRG: 314 | End: 2020-02-08
Attending: EMERGENCY MEDICINE
Payer: MEDICARE

## 2020-02-08 ENCOUNTER — APPOINTMENT (OUTPATIENT)
Dept: CT IMAGING | Age: 80
DRG: 314 | End: 2020-02-08
Attending: EMERGENCY MEDICINE
Payer: MEDICARE

## 2020-02-08 ENCOUNTER — HOSPITAL ENCOUNTER (INPATIENT)
Age: 80
LOS: 3 days | Discharge: HOME | DRG: 314 | End: 2020-02-12
Attending: EMERGENCY MEDICINE | Admitting: ANESTHESIOLOGY
Payer: MEDICARE

## 2020-02-08 DIAGNOSIS — A41.9 SEPSIS, DUE TO UNSPECIFIED ORGANISM, UNSPECIFIED WHETHER ACUTE ORGAN DYSFUNCTION PRESENT (HCC): Primary | ICD-10-CM

## 2020-02-08 DIAGNOSIS — I21.3 ST ELEVATION (STEMI) MYOCARDIAL INFARCTION (HCC): ICD-10-CM

## 2020-02-08 LAB
ALBUMIN SERPL-MCNC: 3.5 G/DL (ref 3.5–5)
ALBUMIN/GLOB SERPL: 1.1 {RATIO} (ref 1.1–2.2)
ALP SERPL-CCNC: 276 U/L (ref 45–117)
ALT SERPL-CCNC: 329 U/L (ref 12–78)
ANION GAP SERPL CALC-SCNC: 9 MMOL/L (ref 5–15)
AST SERPL-CCNC: 203 U/L (ref 15–37)
BASOPHILS # BLD: 0 K/UL (ref 0–0.1)
BASOPHILS NFR BLD: 0 % (ref 0–1)
BILIRUB SERPL-MCNC: 1.7 MG/DL (ref 0.2–1)
BNP SERPL-MCNC: 860 PG/ML
BUN SERPL-MCNC: 35 MG/DL (ref 6–20)
BUN/CREAT SERPL: 29 (ref 12–20)
CALCIUM SERPL-MCNC: 9 MG/DL (ref 8.5–10.1)
CHLORIDE SERPL-SCNC: 89 MMOL/L (ref 97–108)
CO2 SERPL-SCNC: 23 MMOL/L (ref 21–32)
COMMENT, HOLDF: NORMAL
CREAT SERPL-MCNC: 1.19 MG/DL (ref 0.55–1.02)
DIFFERENTIAL METHOD BLD: ABNORMAL
EOSINOPHIL # BLD: 0 K/UL (ref 0–0.4)
EOSINOPHIL NFR BLD: 0 % (ref 0–7)
ERYTHROCYTE [DISTWIDTH] IN BLOOD BY AUTOMATED COUNT: 19.1 % (ref 11.5–14.5)
FLUAV AG NPH QL IA: NEGATIVE
FLUBV AG NOSE QL IA: NEGATIVE
GLOBULIN SER CALC-MCNC: 3.2 G/DL (ref 2–4)
GLUCOSE SERPL-MCNC: 227 MG/DL (ref 65–100)
HCT VFR BLD AUTO: 32.6 % (ref 35–47)
HEMOCCULT STL QL: NEGATIVE
HGB BLD-MCNC: 10.5 G/DL (ref 11.5–16)
IMM GRANULOCYTES # BLD AUTO: 0.1 K/UL (ref 0–0.04)
IMM GRANULOCYTES NFR BLD AUTO: 1 % (ref 0–0.5)
LACTATE SERPL-SCNC: 3.1 MMOL/L (ref 0.4–2)
LYMPHOCYTES # BLD: 1.1 K/UL (ref 0.8–3.5)
LYMPHOCYTES NFR BLD: 13 % (ref 12–49)
MAGNESIUM SERPL-MCNC: 2.5 MG/DL (ref 1.6–2.4)
MCH RBC QN AUTO: 28.6 PG (ref 26–34)
MCHC RBC AUTO-ENTMCNC: 32.2 G/DL (ref 30–36.5)
MCV RBC AUTO: 88.8 FL (ref 80–99)
MONOCYTES # BLD: 0.7 K/UL (ref 0–1)
MONOCYTES NFR BLD: 8 % (ref 5–13)
NEUTS SEG # BLD: 6.4 K/UL (ref 1.8–8)
NEUTS SEG NFR BLD: 78 % (ref 32–75)
NRBC # BLD: 0.03 K/UL (ref 0–0.01)
NRBC BLD-RTO: 0.4 PER 100 WBC
PLATELET # BLD AUTO: 194 K/UL (ref 150–400)
PMV BLD AUTO: 11.4 FL (ref 8.9–12.9)
POTASSIUM SERPL-SCNC: 5.8 MMOL/L (ref 3.5–5.1)
PROT SERPL-MCNC: 6.7 G/DL (ref 6.4–8.2)
RBC # BLD AUTO: 3.67 M/UL (ref 3.8–5.2)
SAMPLES BEING HELD,HOLD: NORMAL
SODIUM SERPL-SCNC: 121 MMOL/L (ref 136–145)
TROPONIN I SERPL-MCNC: <0.05 NG/ML
WBC # BLD AUTO: 8.3 K/UL (ref 3.6–11)

## 2020-02-08 PROCEDURE — 71275 CT ANGIOGRAPHY CHEST: CPT

## 2020-02-08 PROCEDURE — 83605 ASSAY OF LACTIC ACID: CPT

## 2020-02-08 PROCEDURE — 93005 ELECTROCARDIOGRAM TRACING: CPT

## 2020-02-08 PROCEDURE — 86900 BLOOD TYPING SEROLOGIC ABO: CPT

## 2020-02-08 PROCEDURE — 85025 COMPLETE CBC W/AUTO DIFF WBC: CPT

## 2020-02-08 PROCEDURE — 87040 BLOOD CULTURE FOR BACTERIA: CPT

## 2020-02-08 PROCEDURE — 96361 HYDRATE IV INFUSION ADD-ON: CPT

## 2020-02-08 PROCEDURE — 74011250636 HC RX REV CODE- 250/636: Performed by: EMERGENCY MEDICINE

## 2020-02-08 PROCEDURE — 80053 COMPREHEN METABOLIC PANEL: CPT

## 2020-02-08 PROCEDURE — 74011636320 HC RX REV CODE- 636/320: Performed by: RADIOLOGY

## 2020-02-08 PROCEDURE — 36415 COLL VENOUS BLD VENIPUNCTURE: CPT

## 2020-02-08 PROCEDURE — 84484 ASSAY OF TROPONIN QUANT: CPT

## 2020-02-08 PROCEDURE — 83735 ASSAY OF MAGNESIUM: CPT

## 2020-02-08 PROCEDURE — 82272 OCCULT BLD FECES 1-3 TESTS: CPT

## 2020-02-08 PROCEDURE — 99285 EMERGENCY DEPT VISIT HI MDM: CPT

## 2020-02-08 PROCEDURE — 74011000250 HC RX REV CODE- 250: Performed by: EMERGENCY MEDICINE

## 2020-02-08 PROCEDURE — 87804 INFLUENZA ASSAY W/OPTIC: CPT

## 2020-02-08 PROCEDURE — 74174 CTA ABD&PLVS W/CONTRAST: CPT

## 2020-02-08 PROCEDURE — 94640 AIRWAY INHALATION TREATMENT: CPT

## 2020-02-08 PROCEDURE — 74011000258 HC RX REV CODE- 258: Performed by: RADIOLOGY

## 2020-02-08 PROCEDURE — 71045 X-RAY EXAM CHEST 1 VIEW: CPT

## 2020-02-08 PROCEDURE — 83880 ASSAY OF NATRIURETIC PEPTIDE: CPT

## 2020-02-08 PROCEDURE — 96375 TX/PRO/DX INJ NEW DRUG ADDON: CPT

## 2020-02-08 PROCEDURE — 74011250637 HC RX REV CODE- 250/637

## 2020-02-08 RX ORDER — SODIUM CHLORIDE 0.9 % (FLUSH) 0.9 %
10 SYRINGE (ML) INJECTION
Status: COMPLETED | OUTPATIENT
Start: 2020-02-08 | End: 2020-02-08

## 2020-02-08 RX ORDER — SODIUM CHLORIDE 0.9 % (FLUSH) 0.9 %
5-10 SYRINGE (ML) INJECTION AS NEEDED
Status: DISCONTINUED | OUTPATIENT
Start: 2020-02-08 | End: 2020-02-12 | Stop reason: HOSPADM

## 2020-02-08 RX ORDER — ALBUTEROL SULFATE 0.83 MG/ML
2.5 SOLUTION RESPIRATORY (INHALATION)
Status: COMPLETED | OUTPATIENT
Start: 2020-02-08 | End: 2020-02-09

## 2020-02-08 RX ORDER — ACETAMINOPHEN 650 MG/1
650 SUPPOSITORY RECTAL
Status: COMPLETED | OUTPATIENT
Start: 2020-02-08 | End: 2020-02-08

## 2020-02-08 RX ORDER — ONDANSETRON 2 MG/ML
8 INJECTION INTRAMUSCULAR; INTRAVENOUS
Status: COMPLETED | OUTPATIENT
Start: 2020-02-08 | End: 2020-02-08

## 2020-02-08 RX ORDER — ACETAMINOPHEN 650 MG/1
SUPPOSITORY RECTAL
Status: COMPLETED
Start: 2020-02-08 | End: 2020-02-08

## 2020-02-08 RX ORDER — LIDOCAINE 50 MG/G
2 PATCH TOPICAL EVERY 24 HOURS
Status: DISCONTINUED | OUTPATIENT
Start: 2020-02-08 | End: 2020-02-10 | Stop reason: CLARIF

## 2020-02-08 RX ORDER — CALCIUM GLUCONATE 94 MG/ML
1 INJECTION, SOLUTION INTRAVENOUS
Status: COMPLETED | OUTPATIENT
Start: 2020-02-08 | End: 2020-02-09

## 2020-02-08 RX ADMIN — ACETAMINOPHEN 650 MG: 650 SUPPOSITORY RECTAL at 23:41

## 2020-02-08 RX ADMIN — Medication 10 ML: at 23:56

## 2020-02-08 RX ADMIN — SODIUM CHLORIDE 1000 ML: 900 INJECTION, SOLUTION INTRAVENOUS at 22:46

## 2020-02-08 RX ADMIN — ONDANSETRON 8 MG: 2 INJECTION INTRAMUSCULAR; INTRAVENOUS at 22:51

## 2020-02-08 RX ADMIN — SODIUM CHLORIDE 100 ML: 900 INJECTION, SOLUTION INTRAVENOUS at 23:56

## 2020-02-08 RX ADMIN — IOPAMIDOL 80 ML: 755 INJECTION, SOLUTION INTRAVENOUS at 23:56

## 2020-02-08 RX ADMIN — SODIUM CHLORIDE 1000 ML: 900 INJECTION, SOLUTION INTRAVENOUS at 23:03

## 2020-02-08 NOTE — Clinical Note
TRANSFER - OUT REPORT:     Verbal report given to: Maya. Report consisted of patient's Situation, Background, Assessment and   Recommendations(SBAR). Opportunity for questions and clarification was provided. Patient transported with a Registered Nurse. Patient transported to: CCU.

## 2020-02-09 ENCOUNTER — APPOINTMENT (OUTPATIENT)
Dept: NON INVASIVE DIAGNOSTICS | Age: 80
DRG: 314 | End: 2020-02-09
Attending: NURSE PRACTITIONER
Payer: MEDICARE

## 2020-02-09 PROBLEM — I31.39 PERICARDIAL EFFUSION: Status: ACTIVE | Noted: 2020-02-09

## 2020-02-09 LAB
ABO + RH BLD: NORMAL
ALBUMIN SERPL-MCNC: 2.7 G/DL (ref 3.5–5)
ALBUMIN SERPL-MCNC: 2.7 G/DL (ref 3.5–5)
ALBUMIN SERPL-MCNC: 2.9 G/DL (ref 3.5–5)
ALBUMIN/GLOB SERPL: 1 {RATIO} (ref 1.1–2.2)
ALBUMIN/GLOB SERPL: 1 {RATIO} (ref 1.1–2.2)
ALP SERPL-CCNC: 219 U/L (ref 45–117)
ALP SERPL-CCNC: 221 U/L (ref 45–117)
ALT SERPL-CCNC: 429 U/L (ref 12–78)
ALT SERPL-CCNC: 452 U/L (ref 12–78)
ANION GAP SERPL CALC-SCNC: 10 MMOL/L (ref 5–15)
ANION GAP SERPL CALC-SCNC: 4 MMOL/L (ref 5–15)
ANION GAP SERPL CALC-SCNC: 6 MMOL/L (ref 5–15)
ANION GAP SERPL CALC-SCNC: 7 MMOL/L (ref 5–15)
APPEARANCE FLD: ABNORMAL
APPEARANCE UR: ABNORMAL
APPEARANCE UR: ABNORMAL
APTT PPP: 26.2 SEC (ref 22.1–32)
AST SERPL-CCNC: 288 U/L (ref 15–37)
AST SERPL-CCNC: 325 U/L (ref 15–37)
ATRIAL RATE: 300 BPM
ATRIAL RATE: 92 BPM
AV PEAK GRADIENT: 98.09 MMHG
AV VELOCITY RATIO: 0.66
B PERT DNA SPEC QL NAA+PROBE: NOT DETECTED
BACTERIA URNS QL MICRO: NEGATIVE /HPF
BACTERIA URNS QL MICRO: NEGATIVE /HPF
BASOPHILS # BLD: 0 K/UL (ref 0–0.1)
BASOPHILS NFR BLD: 0 % (ref 0–1)
BILIRUB SERPL-MCNC: 1.3 MG/DL (ref 0.2–1)
BILIRUB SERPL-MCNC: 2 MG/DL (ref 0.2–1)
BILIRUB UR QL CFM: NEGATIVE
BILIRUB UR QL: NEGATIVE
BLOOD GROUP ANTIBODIES SERPL: NORMAL
BORDETELLA PARAPERTUSSIS PCR, BORPAR: NOT DETECTED
BUN SERPL-MCNC: 28 MG/DL (ref 6–20)
BUN SERPL-MCNC: 30 MG/DL (ref 6–20)
BUN SERPL-MCNC: 31 MG/DL (ref 6–20)
BUN SERPL-MCNC: 34 MG/DL (ref 6–20)
BUN/CREAT SERPL: 32 (ref 12–20)
BUN/CREAT SERPL: 33 (ref 12–20)
BUN/CREAT SERPL: 34 (ref 12–20)
BUN/CREAT SERPL: 35 (ref 12–20)
C PNEUM DNA SPEC QL NAA+PROBE: NOT DETECTED
CALCIUM SERPL-MCNC: 8.2 MG/DL (ref 8.5–10.1)
CALCIUM SERPL-MCNC: 8.4 MG/DL (ref 8.5–10.1)
CALCIUM SERPL-MCNC: 8.5 MG/DL (ref 8.5–10.1)
CALCIUM SERPL-MCNC: 8.6 MG/DL (ref 8.5–10.1)
CALCULATED R AXIS, ECG10: -85 DEGREES
CALCULATED R AXIS, ECG10: -86 DEGREES
CALCULATED T AXIS, ECG11: 59 DEGREES
CALCULATED T AXIS, ECG11: 69 DEGREES
CHLORIDE SERPL-SCNC: 100 MMOL/L (ref 97–108)
CHLORIDE SERPL-SCNC: 100 MMOL/L (ref 97–108)
CHLORIDE SERPL-SCNC: 103 MMOL/L (ref 97–108)
CHLORIDE SERPL-SCNC: 98 MMOL/L (ref 97–108)
CO2 SERPL-SCNC: 17 MMOL/L (ref 21–32)
CO2 SERPL-SCNC: 22 MMOL/L (ref 21–32)
CO2 SERPL-SCNC: 22 MMOL/L (ref 21–32)
CO2 SERPL-SCNC: 24 MMOL/L (ref 21–32)
COLOR FLD: ABNORMAL
COLOR UR: ABNORMAL
COLOR UR: ABNORMAL
CORTIS SERPL-MCNC: 40 UG/DL
CREAT SERPL-MCNC: 0.85 MG/DL (ref 0.55–1.02)
CREAT SERPL-MCNC: 0.88 MG/DL (ref 0.55–1.02)
CREAT SERPL-MCNC: 0.89 MG/DL (ref 0.55–1.02)
CREAT SERPL-MCNC: 1.07 MG/DL (ref 0.55–1.02)
CREAT UR-MCNC: 80.8 MG/DL
CRP SERPL-MCNC: 3.98 MG/DL (ref 0–0.6)
DIAGNOSIS, 93000: NORMAL
DIAGNOSIS, 93000: NORMAL
DIFFERENTIAL METHOD BLD: ABNORMAL
ECHO AO ROOT DIAM: 3.19 CM
ECHO AV AREA PEAK VELOCITY: 1.9 CM2
ECHO AV PEAK GRADIENT: 10.7 MMHG
ECHO AV PEAK VELOCITY: 163.25 CM/S
ECHO AV REGURGITANT PHT: 774.2 CM
ECHO LA AREA 4C: 23.4 CM2
ECHO LA MAJOR AXIS: 3.68 CM
ECHO LA TO AORTIC ROOT RATIO: 1.15
ECHO LA VOL 2C: 75.67 ML (ref 22–52)
ECHO LA VOL 4C: 58.55 ML (ref 22–52)
ECHO LA VOL BP: 71.01 ML (ref 22–52)
ECHO LA VOL/BSA BIPLANE: 39.14 ML/M2 (ref 16–28)
ECHO LA VOLUME INDEX A2C: 41.71 ML/M2 (ref 16–28)
ECHO LA VOLUME INDEX A4C: 32.28 ML/M2 (ref 16–28)
ECHO LV E' LATERAL VELOCITY: 7.23 CM/S
ECHO LV E' SEPTAL VELOCITY: 5.84 CM/S
ECHO LV EDV A2C: 69.6 ML
ECHO LV EDV A4C: 72.6 ML
ECHO LV EDV BP: 73.6 ML (ref 56–104)
ECHO LV EDV INDEX A4C: 40 ML/M2
ECHO LV EDV INDEX BP: 40.6 ML/M2
ECHO LV EDV NDEX A2C: 38.4 ML/M2
ECHO LV EJECTION FRACTION A2C: 57 %
ECHO LV EJECTION FRACTION A4C: 52 %
ECHO LV EJECTION FRACTION BIPLANE: 54.1 % (ref 55–100)
ECHO LV ESV A2C: 30 ML
ECHO LV ESV A4C: 35.1 ML
ECHO LV ESV BP: 33.8 ML (ref 19–49)
ECHO LV ESV INDEX A2C: 16.5 ML/M2
ECHO LV ESV INDEX A4C: 19.3 ML/M2
ECHO LV ESV INDEX BP: 18.6 ML/M2
ECHO LV INTERNAL DIMENSION DIASTOLIC: 4.32 CM (ref 3.9–5.3)
ECHO LV INTERNAL DIMENSION SYSTOLIC: 3.13 CM
ECHO LV IVSD: 1.18 CM (ref 0.6–0.9)
ECHO LV MASS 2D: 201.9 G (ref 67–162)
ECHO LV MASS INDEX 2D: 111.3 G/M2 (ref 43–95)
ECHO LV POSTERIOR WALL DIASTOLIC: 1.11 CM (ref 0.6–0.9)
ECHO LVOT DIAM: 1.92 CM
ECHO LVOT PEAK GRADIENT: 4.7 MMHG
ECHO LVOT PEAK VELOCITY: 107.91 CM/S
ECHO MV E VELOCITY: 86.33 CM/S
ECHO MV E/E' LATERAL: 11.94
ECHO MV E/E' RATIO (AVERAGED): 13.36
ECHO MV E/E' SEPTAL: 14.78
ECHO PV MAX VELOCITY: 73.12 CM/S
ECHO PV PEAK GRADIENT: 2.1 MMHG
ECHO RV INTERNAL DIMENSION: 2.02 CM
ECHO RV TAPSE: 1.29 CM (ref 1.5–2)
ECHO TV REGURGITANT MAX VELOCITY: 210.78 CM/S
ECHO TV REGURGITANT PEAK GRADIENT: 17.8 MMHG
EOSINOPHIL # BLD: 0 K/UL (ref 0–0.4)
EOSINOPHIL NFR BLD: 0 % (ref 0–7)
EOSINOPHIL NFR FLD MANUAL: 4 %
EPITH CASTS URNS QL MICRO: ABNORMAL /LPF
EPITH CASTS URNS QL MICRO: ABNORMAL /LPF
ERYTHROCYTE [DISTWIDTH] IN BLOOD BY AUTOMATED COUNT: 18.7 % (ref 11.5–14.5)
ERYTHROCYTE [DISTWIDTH] IN BLOOD BY AUTOMATED COUNT: 19.1 % (ref 11.5–14.5)
ERYTHROCYTE [SEDIMENTATION RATE] IN BLOOD: 33 MM/HR (ref 0–30)
EST. AVERAGE GLUCOSE BLD GHB EST-MCNC: 137 MG/DL
FLUAV H1 2009 PAND RNA SPEC QL NAA+PROBE: NOT DETECTED
FLUAV H1 RNA SPEC QL NAA+PROBE: NOT DETECTED
FLUAV H3 RNA SPEC QL NAA+PROBE: NOT DETECTED
FLUAV SUBTYP SPEC NAA+PROBE: NOT DETECTED
FLUBV RNA SPEC QL NAA+PROBE: NOT DETECTED
GLOBULIN SER CALC-MCNC: 2.7 G/DL (ref 2–4)
GLOBULIN SER CALC-MCNC: 2.8 G/DL (ref 2–4)
GLUCOSE BLD STRIP.AUTO-MCNC: 120 MG/DL (ref 65–100)
GLUCOSE BLD STRIP.AUTO-MCNC: 132 MG/DL (ref 65–100)
GLUCOSE BLD STRIP.AUTO-MCNC: 147 MG/DL (ref 65–100)
GLUCOSE BLD STRIP.AUTO-MCNC: 212 MG/DL (ref 65–100)
GLUCOSE FLD-MCNC: 102 MG/DL
GLUCOSE SERPL-MCNC: 105 MG/DL (ref 65–100)
GLUCOSE SERPL-MCNC: 175 MG/DL (ref 65–100)
GLUCOSE SERPL-MCNC: 176 MG/DL (ref 65–100)
GLUCOSE SERPL-MCNC: 193 MG/DL (ref 65–100)
GLUCOSE UR STRIP.AUTO-MCNC: NEGATIVE MG/DL
GLUCOSE UR STRIP.AUTO-MCNC: NEGATIVE MG/DL
HADV DNA SPEC QL NAA+PROBE: NOT DETECTED
HBA1C MFR BLD: 6.4 % (ref 4–5.6)
HCOV 229E RNA SPEC QL NAA+PROBE: NOT DETECTED
HCOV HKU1 RNA SPEC QL NAA+PROBE: NOT DETECTED
HCOV NL63 RNA SPEC QL NAA+PROBE: NOT DETECTED
HCOV OC43 RNA SPEC QL NAA+PROBE: NOT DETECTED
HCT VFR BLD AUTO: 30 % (ref 35–47)
HCT VFR BLD AUTO: 30.9 % (ref 35–47)
HGB BLD-MCNC: 9.6 G/DL (ref 11.5–16)
HGB BLD-MCNC: 9.8 G/DL (ref 11.5–16)
HGB UR QL STRIP: ABNORMAL
HGB UR QL STRIP: ABNORMAL
HMPV RNA SPEC QL NAA+PROBE: NOT DETECTED
HPIV1 RNA SPEC QL NAA+PROBE: NOT DETECTED
HPIV2 RNA SPEC QL NAA+PROBE: NOT DETECTED
HPIV3 RNA SPEC QL NAA+PROBE: NOT DETECTED
HPIV4 RNA SPEC QL NAA+PROBE: NOT DETECTED
HYALINE CASTS URNS QL MICRO: ABNORMAL /LPF (ref 0–5)
IMM GRANULOCYTES # BLD AUTO: 0.1 K/UL (ref 0–0.04)
IMM GRANULOCYTES NFR BLD AUTO: 1 % (ref 0–0.5)
INR PPP: 1.2 (ref 0.9–1.1)
KETONES UR QL STRIP.AUTO: ABNORMAL MG/DL
KETONES UR QL STRIP.AUTO: NEGATIVE MG/DL
LACTATE SERPL-SCNC: 1.7 MMOL/L (ref 0.4–2)
LACTATE SERPL-SCNC: 3.8 MMOL/L (ref 0.4–2)
LEUKOCYTE ESTERASE UR QL STRIP.AUTO: ABNORMAL
LEUKOCYTE ESTERASE UR QL STRIP.AUTO: NEGATIVE
LVFS 2D: 27.43 %
LYMPHOCYTES # BLD: 0.9 K/UL (ref 0.8–3.5)
LYMPHOCYTES NFR BLD: 9 % (ref 12–49)
LYMPHOCYTES NFR FLD: 34 %
M PNEUMO DNA SPEC QL NAA+PROBE: NOT DETECTED
MCH RBC QN AUTO: 28.7 PG (ref 26–34)
MCH RBC QN AUTO: 28.7 PG (ref 26–34)
MCHC RBC AUTO-ENTMCNC: 31.1 G/DL (ref 30–36.5)
MCHC RBC AUTO-ENTMCNC: 32.7 G/DL (ref 30–36.5)
MCV RBC AUTO: 88 FL (ref 80–99)
MCV RBC AUTO: 92.2 FL (ref 80–99)
MONOCYTES # BLD: 0.9 K/UL (ref 0–1)
MONOCYTES NFR BLD: 9 % (ref 5–13)
MONOS+MACROS NFR FLD: 12 %
NEUTROPHILS NFR FLD: 49 %
NEUTS SEG # BLD: 7.8 K/UL (ref 1.8–8)
NEUTS SEG NFR BLD: 81 % (ref 32–75)
NITRITE UR QL STRIP.AUTO: NEGATIVE
NITRITE UR QL STRIP.AUTO: NEGATIVE
NRBC # BLD: 0 K/UL (ref 0–0.01)
NRBC # BLD: 0.02 K/UL (ref 0–0.01)
NRBC BLD-RTO: 0 PER 100 WBC
NRBC BLD-RTO: 0.2 PER 100 WBC
NUC CELL # FLD: 2985 /CU MM
OTHER CELL,FOTHC: 1 %
PH UR STRIP: 5 [PH] (ref 5–8)
PH UR STRIP: 5 [PH] (ref 5–8)
PHOSPHATE SERPL-MCNC: 3.2 MG/DL (ref 2.6–4.7)
PISA AR MAX VEL: 495.19 CM/S
PLATELET # BLD AUTO: 165 K/UL (ref 150–400)
PLATELET # BLD AUTO: 193 K/UL (ref 150–400)
PMV BLD AUTO: 11 FL (ref 8.9–12.9)
PMV BLD AUTO: 11.2 FL (ref 8.9–12.9)
POTASSIUM SERPL-SCNC: 4.4 MMOL/L (ref 3.5–5.1)
POTASSIUM SERPL-SCNC: 4.6 MMOL/L (ref 3.5–5.1)
POTASSIUM SERPL-SCNC: 4.7 MMOL/L (ref 3.5–5.1)
POTASSIUM SERPL-SCNC: 5.6 MMOL/L (ref 3.5–5.1)
PROT SERPL-MCNC: 5.4 G/DL (ref 6.4–8.2)
PROT SERPL-MCNC: 5.7 G/DL (ref 6.4–8.2)
PROT UR STRIP-MCNC: 100 MG/DL
PROT UR STRIP-MCNC: 300 MG/DL
PROTHROMBIN TIME: 12.2 SEC (ref 9–11.1)
Q-T INTERVAL, ECG07: 458 MS
Q-T INTERVAL, ECG07: 472 MS
QRS DURATION, ECG06: 156 MS
QRS DURATION, ECG06: 158 MS
QTC CALCULATION (BEZET), ECG08: 527 MS
QTC CALCULATION (BEZET), ECG08: 528 MS
RBC # BLD AUTO: 3.35 M/UL (ref 3.8–5.2)
RBC # BLD AUTO: 3.41 M/UL (ref 3.8–5.2)
RBC # FLD: >100 /CU MM
RBC #/AREA URNS HPF: ABNORMAL /HPF (ref 0–5)
RBC #/AREA URNS HPF: ABNORMAL /HPF (ref 0–5)
RSV RNA SPEC QL NAA+PROBE: NOT DETECTED
RV+EV RNA SPEC QL NAA+PROBE: NOT DETECTED
SERVICE CMNT-IMP: ABNORMAL
SODIUM SERPL-SCNC: 125 MMOL/L (ref 136–145)
SODIUM SERPL-SCNC: 128 MMOL/L (ref 136–145)
SODIUM SERPL-SCNC: 129 MMOL/L (ref 136–145)
SODIUM SERPL-SCNC: 131 MMOL/L (ref 136–145)
SODIUM UR-SCNC: 9 MMOL/L
SP GR UR REFRACTOMETRY: 1.02
SP GR UR REFRACTOMETRY: >1.03
SPECIMEN EXP DATE BLD: NORMAL
SPECIMEN SOURCE FLD: ABNORMAL
SPECIMEN SOURCE FLD: NORMAL
THERAPEUTIC RANGE,PTTT: NORMAL SECS (ref 58–77)
TSH SERPL DL<=0.05 MIU/L-ACNC: 1.1 UIU/ML (ref 0.36–3.74)
TSH SERPL DL<=0.05 MIU/L-ACNC: 3.16 UIU/ML (ref 0.36–3.74)
UA: UC IF INDICATED,UAUC: ABNORMAL
UROBILINOGEN UR QL STRIP.AUTO: 1 EU/DL (ref 0.2–1)
UROBILINOGEN UR QL STRIP.AUTO: 1 EU/DL (ref 0.2–1)
VENTRICULAR RATE, ECG03: 75 BPM
VENTRICULAR RATE, ECG03: 80 BPM
WBC # BLD AUTO: 10.6 K/UL (ref 3.6–11)
WBC # BLD AUTO: 9.7 K/UL (ref 3.6–11)
WBC URNS QL MICRO: ABNORMAL /HPF (ref 0–4)
WBC URNS QL MICRO: ABNORMAL /HPF (ref 0–4)

## 2020-02-09 PROCEDURE — 74011250636 HC RX REV CODE- 250/636: Performed by: EMERGENCY MEDICINE

## 2020-02-09 PROCEDURE — 87086 URINE CULTURE/COLONY COUNT: CPT

## 2020-02-09 PROCEDURE — 74011250636 HC RX REV CODE- 250/636: Performed by: INTERNAL MEDICINE

## 2020-02-09 PROCEDURE — 84443 ASSAY THYROID STIM HORMONE: CPT

## 2020-02-09 PROCEDURE — 93306 TTE W/DOPPLER COMPLETE: CPT

## 2020-02-09 PROCEDURE — 0W9D30Z DRAINAGE OF PERICARDIAL CAVITY WITH DRAINAGE DEVICE, PERCUTANEOUS APPROACH: ICD-10-PCS | Performed by: INTERNAL MEDICINE

## 2020-02-09 PROCEDURE — 96375 TX/PRO/DX INJ NEW DRUG ADDON: CPT

## 2020-02-09 PROCEDURE — 80069 RENAL FUNCTION PANEL: CPT

## 2020-02-09 PROCEDURE — 83605 ASSAY OF LACTIC ACID: CPT

## 2020-02-09 PROCEDURE — 74011250636 HC RX REV CODE- 250/636: Performed by: NURSE PRACTITIONER

## 2020-02-09 PROCEDURE — 74011636637 HC RX REV CODE- 636/637: Performed by: INTERNAL MEDICINE

## 2020-02-09 PROCEDURE — 84300 ASSAY OF URINE SODIUM: CPT

## 2020-02-09 PROCEDURE — 65660000000 HC RM CCU STEPDOWN

## 2020-02-09 PROCEDURE — 74011000250 HC RX REV CODE- 250: Performed by: INTERNAL MEDICINE

## 2020-02-09 PROCEDURE — 85025 COMPLETE CBC W/AUTO DIFF WBC: CPT

## 2020-02-09 PROCEDURE — C1892 INTRO/SHEATH,FIXED,PEEL-AWAY: HCPCS | Performed by: INTERNAL MEDICINE

## 2020-02-09 PROCEDURE — 33016 PERICARDIOCENTESIS W/IMAGING: CPT | Performed by: INTERNAL MEDICINE

## 2020-02-09 PROCEDURE — 81003 URINALYSIS AUTO W/O SCOPE: CPT

## 2020-02-09 PROCEDURE — 82962 GLUCOSE BLOOD TEST: CPT

## 2020-02-09 PROCEDURE — 0100U RESPIRATORY PANEL,PCR,NASOPHARYNGEAL: CPT

## 2020-02-09 PROCEDURE — 80053 COMPREHEN METABOLIC PANEL: CPT

## 2020-02-09 PROCEDURE — 87116 MYCOBACTERIA CULTURE: CPT

## 2020-02-09 PROCEDURE — 99153 MOD SED SAME PHYS/QHP EA: CPT | Performed by: INTERNAL MEDICINE

## 2020-02-09 PROCEDURE — 87205 SMEAR GRAM STAIN: CPT

## 2020-02-09 PROCEDURE — 85027 COMPLETE CBC AUTOMATED: CPT

## 2020-02-09 PROCEDURE — 77030013744: Performed by: INTERNAL MEDICINE

## 2020-02-09 PROCEDURE — 82570 ASSAY OF URINE CREATININE: CPT

## 2020-02-09 PROCEDURE — 81001 URINALYSIS AUTO W/SCOPE: CPT

## 2020-02-09 PROCEDURE — 86140 C-REACTIVE PROTEIN: CPT

## 2020-02-09 PROCEDURE — 85730 THROMBOPLASTIN TIME PARTIAL: CPT

## 2020-02-09 PROCEDURE — 93005 ELECTROCARDIOGRAM TRACING: CPT

## 2020-02-09 PROCEDURE — 88112 CYTOPATH CELL ENHANCE TECH: CPT

## 2020-02-09 PROCEDURE — 77030002996 HC SUT SLK J&J -A: Performed by: INTERNAL MEDICINE

## 2020-02-09 PROCEDURE — 77030019905 HC CATH URETH INTMIT MDII -A

## 2020-02-09 PROCEDURE — 36415 COLL VENOUS BLD VENIPUNCTURE: CPT

## 2020-02-09 PROCEDURE — 85652 RBC SED RATE AUTOMATED: CPT

## 2020-02-09 PROCEDURE — 74011250637 HC RX REV CODE- 250/637: Performed by: INTERNAL MEDICINE

## 2020-02-09 PROCEDURE — 82945 GLUCOSE OTHER FLUID: CPT

## 2020-02-09 PROCEDURE — 88305 TISSUE EXAM BY PATHOLOGIST: CPT

## 2020-02-09 PROCEDURE — 83036 HEMOGLOBIN GLYCOSYLATED A1C: CPT

## 2020-02-09 PROCEDURE — 82533 TOTAL CORTISOL: CPT

## 2020-02-09 PROCEDURE — 65620000000 HC RM CCU GENERAL

## 2020-02-09 PROCEDURE — 99152 MOD SED SAME PHYS/QHP 5/>YRS: CPT | Performed by: INTERNAL MEDICINE

## 2020-02-09 PROCEDURE — 85610 PROTHROMBIN TIME: CPT

## 2020-02-09 PROCEDURE — 74011000250 HC RX REV CODE- 250: Performed by: EMERGENCY MEDICINE

## 2020-02-09 PROCEDURE — C1729 CATH, DRAINAGE: HCPCS | Performed by: INTERNAL MEDICINE

## 2020-02-09 PROCEDURE — 74011000258 HC RX REV CODE- 258: Performed by: EMERGENCY MEDICINE

## 2020-02-09 PROCEDURE — 89050 BODY FLUID CELL COUNT: CPT

## 2020-02-09 PROCEDURE — 77030040922 HC BLNKT HYPOTHRM STRY -A

## 2020-02-09 PROCEDURE — 74011000250 HC RX REV CODE- 250: Performed by: NURSE PRACTITIONER

## 2020-02-09 PROCEDURE — 96365 THER/PROPH/DIAG IV INF INIT: CPT

## 2020-02-09 RX ORDER — FUROSEMIDE 10 MG/ML
20 INJECTION INTRAMUSCULAR; INTRAVENOUS 2 TIMES DAILY
Status: DISCONTINUED | OUTPATIENT
Start: 2020-02-09 | End: 2020-02-09

## 2020-02-09 RX ORDER — MORPHINE SULFATE 2 MG/ML
1 INJECTION, SOLUTION INTRAMUSCULAR; INTRAVENOUS
Status: COMPLETED | OUTPATIENT
Start: 2020-02-09 | End: 2020-02-09

## 2020-02-09 RX ORDER — FUROSEMIDE 10 MG/ML
40 INJECTION INTRAMUSCULAR; INTRAVENOUS 2 TIMES DAILY
Status: DISCONTINUED | OUTPATIENT
Start: 2020-02-09 | End: 2020-02-09

## 2020-02-09 RX ORDER — ACETAMINOPHEN 325 MG/1
650 TABLET ORAL
Status: DISCONTINUED | OUTPATIENT
Start: 2020-02-09 | End: 2020-02-12 | Stop reason: HOSPADM

## 2020-02-09 RX ORDER — DEXTROSE MONOHYDRATE 100 MG/ML
0-250 INJECTION, SOLUTION INTRAVENOUS AS NEEDED
Status: DISCONTINUED | OUTPATIENT
Start: 2020-02-09 | End: 2020-02-12 | Stop reason: HOSPADM

## 2020-02-09 RX ORDER — SODIUM CHLORIDE 0.9 % (FLUSH) 0.9 %
5-40 SYRINGE (ML) INJECTION EVERY 8 HOURS
Status: DISCONTINUED | OUTPATIENT
Start: 2020-02-09 | End: 2020-02-12 | Stop reason: HOSPADM

## 2020-02-09 RX ORDER — CARVEDILOL 6.25 MG/1
6.25 TABLET ORAL 2 TIMES DAILY WITH MEALS
Status: DISCONTINUED | OUTPATIENT
Start: 2020-02-09 | End: 2020-02-12 | Stop reason: HOSPADM

## 2020-02-09 RX ORDER — LIDOCAINE HYDROCHLORIDE 10 MG/ML
INJECTION INFILTRATION; PERINEURAL AS NEEDED
Status: DISCONTINUED | OUTPATIENT
Start: 2020-02-09 | End: 2020-02-09 | Stop reason: HOSPADM

## 2020-02-09 RX ORDER — MIDAZOLAM HYDROCHLORIDE 1 MG/ML
INJECTION, SOLUTION INTRAMUSCULAR; INTRAVENOUS AS NEEDED
Status: DISCONTINUED | OUTPATIENT
Start: 2020-02-09 | End: 2020-02-09 | Stop reason: HOSPADM

## 2020-02-09 RX ORDER — INSULIN LISPRO 100 [IU]/ML
INJECTION, SOLUTION INTRAVENOUS; SUBCUTANEOUS
Status: DISCONTINUED | OUTPATIENT
Start: 2020-02-09 | End: 2020-02-12

## 2020-02-09 RX ORDER — ADHESIVE BANDAGE
30 BANDAGE TOPICAL DAILY PRN
Status: DISCONTINUED | OUTPATIENT
Start: 2020-02-09 | End: 2020-02-12 | Stop reason: HOSPADM

## 2020-02-09 RX ORDER — FENTANYL CITRATE 50 UG/ML
INJECTION, SOLUTION INTRAMUSCULAR; INTRAVENOUS AS NEEDED
Status: DISCONTINUED | OUTPATIENT
Start: 2020-02-09 | End: 2020-02-09 | Stop reason: HOSPADM

## 2020-02-09 RX ORDER — MAGNESIUM SULFATE 100 %
4 CRYSTALS MISCELLANEOUS AS NEEDED
Status: DISCONTINUED | OUTPATIENT
Start: 2020-02-09 | End: 2020-02-12 | Stop reason: HOSPADM

## 2020-02-09 RX ORDER — SODIUM CHLORIDE 0.9 % (FLUSH) 0.9 %
5-40 SYRINGE (ML) INJECTION AS NEEDED
Status: DISCONTINUED | OUTPATIENT
Start: 2020-02-09 | End: 2020-02-12 | Stop reason: HOSPADM

## 2020-02-09 RX ORDER — ONDANSETRON 2 MG/ML
4 INJECTION INTRAMUSCULAR; INTRAVENOUS
Status: DISCONTINUED | OUTPATIENT
Start: 2020-02-09 | End: 2020-02-12 | Stop reason: HOSPADM

## 2020-02-09 RX ADMIN — ALBUTEROL SULFATE 2.5 MG: 2.5 SOLUTION RESPIRATORY (INHALATION) at 00:26

## 2020-02-09 RX ADMIN — FAMOTIDINE 20 MG: 10 INJECTION, SOLUTION INTRAVENOUS at 03:00

## 2020-02-09 RX ADMIN — MORPHINE SULFATE 1 MG: 2 INJECTION, SOLUTION INTRAMUSCULAR; INTRAVENOUS at 00:38

## 2020-02-09 RX ADMIN — FAMOTIDINE 20 MG: 10 INJECTION, SOLUTION INTRAVENOUS at 08:57

## 2020-02-09 RX ADMIN — SODIUM CHLORIDE 1000 ML: 900 INJECTION, SOLUTION INTRAVENOUS at 02:41

## 2020-02-09 RX ADMIN — CALCIUM GLUCONATE 1 G: 98 INJECTION, SOLUTION INTRAVENOUS at 00:27

## 2020-02-09 RX ADMIN — CARVEDILOL 6.25 MG: 6.25 TABLET, FILM COATED ORAL at 16:17

## 2020-02-09 RX ADMIN — Medication 10 ML: at 13:48

## 2020-02-09 RX ADMIN — SODIUM CHLORIDE 5 MG/HR: 900 INJECTION, SOLUTION INTRAVENOUS at 06:22

## 2020-02-09 RX ADMIN — Medication 10 ML: at 03:00

## 2020-02-09 RX ADMIN — SODIUM CHLORIDE 133 ML: 900 INJECTION, SOLUTION INTRAVENOUS at 00:32

## 2020-02-09 RX ADMIN — PIPERACILLIN SODIUM AND TAZOBACTAM SODIUM 3.38 G: 3; .375 INJECTION, POWDER, LYOPHILIZED, FOR SOLUTION INTRAVENOUS at 00:31

## 2020-02-09 RX ADMIN — CARVEDILOL 6.25 MG: 6.25 TABLET, FILM COATED ORAL at 10:04

## 2020-02-09 RX ADMIN — Medication 10 ML: at 22:39

## 2020-02-09 RX ADMIN — INSULIN LISPRO 2 UNITS: 100 INJECTION, SOLUTION INTRAVENOUS; SUBCUTANEOUS at 12:16

## 2020-02-09 NOTE — INTERDISCIPLINARY ROUNDS
IDR/SLIDR Summary          Patient: Juan Jose Orlando MRN: [id number]    Age: 78 y.o. YOB: 1940 Room/Bed: [address] [place] [address]   Admit Diagnosis: Pericardial effusion [I31.3]  Principal Diagnosis: <principal problem not specified>   Goals: home  Readmission: NO  Quality Measure: CHF  VTE Prophylaxis: Mechanical  Influenza Vaccine screening completed? YES  Pneumococcal Vaccine screening completed? YES  Mobility needs: No   Nutrition plan:Yes  Consults:Case Management    Financial concerns:Yes  Escalated to ? YES  RRAT Score:    Interventions:Home Health  Testing due for pt today?  NO  LOS: 0 days Expected length of stay 3 days  Discharge plan: home   PCP: Harish Barnes MD  Transportation needs: Yes    Days before discharge:two or more days before discharge   Discharge disposition: Home    Signed:     Amy Garibay RN  2/9/2020  6:30 AM

## 2020-02-09 NOTE — PROGRESS NOTES
0730 Bedside and Verbal shift change report given to CHALINO Batista (oncoming nurse) by Eric Regalado (offgoing nurse). Report included the following information SBAR, Kardex, Intake/Output, MAR and Cardiac Rhythm Paced;BBB.   0920 Dr. Natalia Regalado at bedside. Orders to hold lasix, labs at [facility] sent   1330 Notified by patients daughter that patient was more confused than earlier. Patient disoriented but easily reoriented. Patient left with call bell and bedside table. Bed alarm on  1500 Dr. Krista Lucas notified of confusion. Will check lactic and renal panel. 1930 Bedside and Verbal shift change report given to CHALINO Holder (oncoming nurse) by Nickie Blackwell (offgoing nurse). Report included the following information SBAR, Kardex, Intake/Output, MAR and Cardiac Rhythm Paced;BBB.

## 2020-02-09 NOTE — H&P
[facility]    ICU Team H&P Note    Name: Doris Coronado   : 1940   MRN: [id number]   Date: 2020      Subjective:   Progress Note: 2020      Patient is asked to be seen by Dr. Ricky Ho for hypotension, necessitating the need for possible ICU care. HPI: The patient presented to the ED with a 5 day history of progressive worsening of multiple complaints to include bilateral shoulder pain, chest pain, back pain, chills, weakness, vomiting and diarrhea. She was reportedly seen in the ED on 2020 and sent home. She was seen in our ED this evening and underwent lab studies and radiographic studies which revealed a large pericardial effusion. Further history revealed that she had a pacemaker placed in 2019. Reason for ICU Admission: Pericardial effusion. Active Problem List:     Problem List  Date Reviewed: 1/10/2019          Codes Class    Pericardial effusion ICD-10-CM: I31.3  ICD-9-CM: 423.9         A-fib ([facility] [address] ICD-10-CM: I48.91  ICD-9-CM: 427.31         Acquired hypothyroidism ICD-10-CM: E03.9  ICD-9-CM: 244.9         Controlled type 2 diabetes mellitus without complication ([facility] [address] JDQ-10-RP: E11.9  ICD-9-CM: 250.00         Neck pain on right side ICD-10-CM: M54.2  ICD-9-CM: 723.1         Polymyalgia rheumatica ([facility] [address] ICD-10-CM: M35.3  ICD-9-CM: 628         Thyroid nodule ICD-10-CM: E04.1  ICD-9-CM: 241.0         Hypertension ICD-10-CM: I10  ICD-9-CM: 401.9     Overview Signed 2017  8:40 AM by Alilson MAXWELL     Overview:                    Past Medical History:      has a past medical history of A-fib ([address] Arthritis, Diabetes ([address] GERD (gastroesophageal reflux disease), Hypertension, Ill-defined condition, Polymyalgia ([address] and Thyroid disease. Past Surgical History:      has a past surgical history that includes hx appendectomy; hx dilation and curettage; hx cataract removal (Bilateral); hx blepharoplasty; hx orthopaedic; and hx orthopaedic.     Home Medications:     Prior to Admission medications    Medication Sig Start Date End Date Taking? Authorizing Provider   XARELTO 20 mg tab tablet TAKE ONE TABLET BY MOUTH DAILY 19   Nancy Herrera MD   amLODIPine (NORVASC) 2.5 mg tablet Take 2.5 mg by mouth. Provider, Historical   pantoprazole (PROTONIX) 20 mg tablet  19   Provider, Historical   magnesium 250 mg tab Take  by mouth. Provider, Historical   digestive enzymes cap Take  by mouth. Provider, Historical   dietary supplement cap Take  by mouth. Provider, Historical   CALCIUM-MAGNESIUM PO Take 600 mg by mouth. Provider, Historical   mecobalamin, vitamin B12, 1,000 mcg TbDi Place under the tongue. Provider, Historical   dilTIAZem CD (CARTIA XT) 120 mg ER capsule Take  by mouth daily. Provider, Historical   levothyroxine (SYNTHROID) 100 mcg tablet Take 100 mcg by mouth Daily (before breakfast). Provider, Historical   VIT C/E/ZN/COPPR/LUTEIN/ZEAXAN (PRESERVISION AREDS 2 PO) Take  by mouth. Provider, Historical   cholecalciferol (VITAMIN D3) 1,000 unit cap Take 1,000 Units by mouth daily. Provider, Historical   glucosamine (GLUCOSAMINE RELIEF) 1,000 mg tab Take  by mouth. WITH CHONDROINTIN 1500MG DAILY    Provider, Historical       Allergies/Social/Family History: Allergies   Allergen Reactions    Ciprofloxacin Myalgia    Quinolones Other (comments)     EYES CAN'T FOCUS, PATIENT CANT WALK       Social History     Tobacco Use    Smoking status: Former Smoker    Smokeless tobacco: Never Used   Substance Use Topics    Alcohol use:  Yes     Alcohol/week: 1.0 standard drinks     Types: 1 Glasses of wine per week     Comment: RARE      Family History   Problem Relation Age of Onset    Stroke Mother     Diabetes Mother     Migraines Mother     Cancer Father         PROSTATE CANCER AND COLON CANCER    Liver Disease Father         HAD BLOOD TRANSFUSION,  OF CIRRHOSIS OF LIVER, WAS NON DRINKER    Diabetes Brother    [facility] Anesth Problems Neg Hx        Review of Systems:     A comprehensive review of systems was negative except for that written in the HPI. Objective:   Vital Signs:  Visit Vitals  /71   Pulse 75   Temp 97.5 °F (36.4 °C)   Resp 18   Ht 5' 5\" (1.651 m)   Wt 71.1 kg (156 lb 12 oz)   SpO2 92%   BMI 26.08 kg/m²      O2 Device: Room air Temp (24hrs), Av.5 °F (36.4 °C), Min:97.5 °F (36.4 °C), Max:97.5 °F (36.4 °C)           Intake/Output:     Intake/Output Summary (Last 24 hours) at 2020 0231  Last data filed at 2020 0101  Gross per 24 hour   Intake 2333 ml   Output --   Net 2333 ml       Physical Exam:    General:  Alert, cooperative, well noursished, well developed, appears stated age   Eyes:  Sclera anicteric. Pupils equally round and reactive to light. Mouth/Throat: Mucous membranes normal, oral pharynx clear   Neck: Supple   Lungs:   Clear to auscultation bilaterally, good effort   CV:  Regular rate and rhythm,no murmur, click, rub or gallop   Abdomen:   Soft, non-tender. bowel sounds normal. non-distended   Extremities: No cyanosis or edema   Skin: Skin color, texture, turgor normal. no acute rash or lesions   Lymph nodes: Cervical and supraclavicular normal   Musculoskeletal: No swelling or deformity   Lines/Devices:  Intact, no erythema, drainage or tenderness   Psych: Alert and oriented, normal mood affect given the setting       LABS AND  DATA: Personally reviewed  Recent Labs     208   WBC 8.3   HGB 10.5*   HCT 32.6*        Recent Labs     208   *   K 5.8*   CL 89*   CO2 23   BUN 35*   CREA 1.19*   *   CA 9.0   MG 2.5*     Recent Labs     208   SGOT 203*   *   TP 6.7   ALB 3.5   GLOB 3.2     No results for input(s): INR, PTP, APTT, INREXT in the last 72 hours. No results for input(s): PHI, PCO2I, PO2I, FIO2I in the last 72 hours.   Recent Labs     20  2248   TROIQ <0.05     MEDS: Reviewed    Chest X-Ray: personally reviewed and report checked, large heart and a pacemaker is present. Assessment:     ICU Problems:  Pericardial effusion with tamponade physiology  Hypotension  Obstructive cardiogenic shock    ICU Comprehensive Plan of Care:   Plans for this Shift:   1. Cardiology consult for large pericardial effusion. 2. Renal Consult for hyperkalemia. 3. ICU admission for critical care services. 4. IVF boluses to support blood pressure in the presence of large effusion. Multidisciplinary Rounds Completed:  N/A    ABCDEF Bundle/Checklist  Pain Medications: None  Target RASS: N/A  Sedation Medications: N/A  CAM-ICU:  Negative  Mobility: Bedrest  PT/OT: consult when stable  Restraints: None needed at this time  Discussed Plan of Care (goals of care): Yes  Addressed Code Status: Full Code    CARDIOVASCULAR  Cardiac Gtts: None  SBP Goal of: > 100 mmHg  MAP Goal of: > 65 mmHg  Transfusion Trigger (Hgb): <7.5 g/dL    RESPIRATORY  Vent Goals:   N/A  DVT Prophylaxis (if no, list reason): SCD's or Sequential Compression Device   SPO2 Goal: > 92%  Pulmonary toilet: Incentive Spirometry     GI/  Craig Catheter Present: No  GI Prophylaxis: Pepcid (famotidine)   Nutrition: Pending after procedure  IVFs: NS  Bowel Movement: No  Bowel Regimen: Milk of magnesia  Insulin: NA    ANTIBIOTICS  Antibiotics:  none    T/L/D  Tubes: None  Lines: None  Drains: None    SPECIAL EQUIPMENT  None    DISPOSITION  Stay in ICU    CRITICAL CARE CONSULTANT NOTE  I had a face to face encounter with the patient, reviewed and interpreted patient data including clinical events, labs, images, vital signs, I/O's, and examined patient.   I have discussed the case and the plan and management of the patient's care with the consulting services, the bedside nurses and the respiratory therapist.      NOTE OF PERSONAL INVOLVEMENT IN CARE   This patient has a high probability of imminent, clinically significant deterioration, which requires the highest level of preparedness to intervene urgently. I participated in the decision-making and personally managed or directed the management of the following life and organ supporting interventions that required my frequent assessment to treat or prevent imminent deterioration. I personally spent 45 minutes of critical care time. This is time spent at this critically ill patient's bedside actively involved in patient care as well as the coordination of care and discussions with the patient's family. This does not include any procedural time which has been billed separately.     Yordy Jon, MSN, APRN, ACNP, [id number] [id number]  Staff [facility]  2/9/2020

## 2020-02-09 NOTE — PROGRESS NOTES
Cardiac Cath Lab Procedure Area Arrival Note:    Collin Kruger arrived to Cardiac Cath Lab, Procedure Area. Patient identifiers verified with NAME and DATE OF BIRTH. Procedure verified with patient. Consent forms verified. Allergies verified. Patient informed of procedure and plan of care. Questions answered with review. Patient voiced understanding of procedure and plan of care. Patient on cardiac monitor, non-invasive blood pressure, SPO2 monitor. On RA and placed on O2 @ 2 lpm via NC.  IV of NSS on pump at 100 ml/hr. Patient status doing well with some problems : back pain. Patient is A&Ox 4. Patient reports no chest pain or shortness of breath. Patient medicated during procedure with orders obtained and verified by Dr. Ale Bates. Refer to patients Cardiac Cath Lab PROCEDURE REPORT for vital signs, assessment, status, and response during procedure, printed at end of case. Printed report on chart or scanned into chart.

## 2020-02-09 NOTE — ED NOTES
Pt requested emesis back and had 2 episodes of vomiting. MD notified. Orders received for antiemetic. Pt c/o of upper back pain, daughter requesting pain medicines. MD notified.  Verbal orders received for lidocaine patch

## 2020-02-09 NOTE — ROUTINE PROCESS
SBAR to CCU; aware patient is to be sent to cath lab prior to going to floor. Aware of patient lab results, vitals, and on ward hugger for hypothermia.

## 2020-02-09 NOTE — CONSULTS
[facility]   [address] [facility], [address] [place] [address], [facility]  Phone: [phone] NOTE     Patient: Binu Diana MRN: [id number]  PCP: Cathy Swanson MD   :     1940  Age:   78 y.o. Sex:  female      Referring physician: Renee Fitzgerald DO  Reason for consultation: 78 y.o. female with Pericardial effusion [F58.3] complicated by LAM   Admission Date: 2020 10:28 PM  LOS: 0 days      ASSESSMENT and PLAN :   Hyponatremia and hyperkalemia - suspect volume depletion from prior N+V, diarrhea, poor intake, and hypoperfusion due to pericardial effusion;  Rule out adrenal insufficiency  -TSH is normal    Mild LAM form volume depletion, low C. O from pericardial effusion    Pericardial effusion a/w hypotension  -S/P pericardiocentesis 20; drain remains    PAF, S/P ablation and subsequent BiV pacemaker    Non ischemic CM - most recent LVEF~45-50% per Cards    H/o PMR - no h/o SLE or other atuoimmune disease    DM    HTN    Hypothyroid      REC:  Stop diuretics  Recheck labs now; further therapy pending results  Serum cortisol  Urine for sodium, K+, creat  Check YAJAIRA, ANCA given her effusion and h/o autoimmune disease (PMR)  Serial labs          Thank you for consulting [facility] in the care of your patient. Subjective:   HPI: Binu Diana is a 78 y.o.  female who has been admitted to the hospital for hypotension found to have large pericardial effusion, S/P urgent pericardiocentesis earlier today. She was noted to have hyponatremia and hyperkalemia for which I was asked to see her. She had been feeling poorly for 5-7 days prior with lethargy, pre-syncopal symptoms, seen in ER 2/ -- given IVF diuretic stopped, sent home. Her symptoms persisted, presented to ED this am with the above. She did have 1-2 episodes of vomiting, some diarrhea prior to admission, no NSAID use.   Currently she is resting in CCU, feels much better. Past Medical Hx:   Past Medical History:   Diagnosis Date    A-fib ([address]     Arthritis     OA    Diabetes ([address]     PRE DIABETIC    GERD (gastroesophageal reflux disease)     Hypertension     Ill-defined condition     MACULAR DEGENERATION    Polymyalgia (HCC)     Thyroid disease         Past Surgical Hx:     Past Surgical History:   Procedure Laterality Date    HX APPENDECTOMY      HX BLEPHAROPLASTY      HX CATARACT REMOVAL Bilateral     HX DILATION AND CURETTAGE      HX ORTHOPAEDIC      RIGHT AND LEFT ARTHROSCOPIC SURGERY    HX ORTHOPAEDIC      RIGHT AND LEFT KNEE PARTIAL REPLACEMENTS JOINTS         Allergies   Allergen Reactions    Ciprofloxacin Myalgia    Quinolones Other (comments)     EYES CAN'T FOCUS, PATIENT CANT WALK        Social Hx:  reports that she has quit smoking. She has never used smokeless tobacco. She reports current alcohol use of about 1.0 standard drinks of alcohol per week. She reports that she does not use drugs. Family History   Problem Relation Age of Onset    Stroke Mother     Diabetes Mother     Migraines Mother     Cancer Father         PROSTATE CANCER AND COLON CANCER    Liver Disease Father         HAD BLOOD TRANSFUSION,  OF CIRRHOSIS OF LIVER, WAS NON DRINKER    Diabetes Brother     Anesth Problems Neg Hx        Review of Systems:  A thorough twelve point review of system was performed today. Pertinent positives and negatives are mentioned in the HPI. The reminder of the ROS is negative and noncontributory.      Objective:    Vitals:    Vitals:    20 0630 20 0700 20 0800 20 0900   BP:  124/75 106/86 114/63   Pulse: 76 75 76 75   Resp: 15 15 14 18   Temp:   97.5 °F (36.4 °C)    SpO2: 94% 92% 96% 97%   Weight:       Height:         I&O's:   0701 -  0700  In: 2333 [I.V.:2333]  Out: 1130 [Urine:30; Drains:1100]  Visit Vitals  /63   Pulse 75   Temp 97.5 °F (36.4 °C)   Resp 18   Ht 5' 5\" (1.651 m)   Wt 74.9 kg (165 lb 2 oz)   SpO2 97%   BMI 27.48 kg/m²       Physical Exam:  General:  No apparent Distress  HEENT: full ROM,  No Pallor , No Icterus  Neck: Supple, no JVD  Lungs : sparse crackles at bases  CVS: RRR, S1 S2 normal, No rub   Abdomen: Soft, NT, BS +  Extremities: Edema - 1+ ankle  Skin: No rash or lesions. MS: No joint swelling, erythema, warmth  Neurologic: non focal, AAO x 3  Psych: normal affect    Laboratory Results:    Recent Labs     02/09/20  0554 02/09/20  0328 02/08/20  2248   *  --  121*   K 5.6*  --  5.8*   CL 98  --  89*   CO2 17*  --  23   *  --  227*   BUN 34*  --  35*   CREA 1.07*  --  1.19*   CA 8.4*  --  9.0   MG  --   --  2.5*   ALB 2.7*  --  3.5   SGOT 288*  --  203*   *  --  329*   INR  --  1.2*  --      Recent Labs     02/09/20  0554 02/08/20  2248   WBC 10.6 8.3   HGB 9.6* 10.5*   HCT 30.9* 32.6*    194     No results found for: SDES  Lab Results   Component Value Date/Time    Culture result: NO GROWTH AFTER 6 HOURS 02/08/2020 10:48 PM     Recent Results (from the past 24 hour(s))   LACTIC ACID    Collection Time: 02/08/20 10:40 PM   Result Value Ref Range    Lactic acid 3.1 (HH) 0.4 - 2.0 MMOL/L   CBC WITH AUTOMATED DIFF    Collection Time: 02/08/20 10:48 PM   Result Value Ref Range    WBC 8.3 3.6 - 11.0 K/uL    RBC 3.67 (L) 3.80 - 5.20 M/uL    HGB 10.5 (L) 11.5 - 16.0 g/dL    HCT 32.6 (L) 35.0 - 47.0 %    MCV 88.8 80.0 - 99.0 FL    MCH 28.6 26.0 - 34.0 PG    MCHC 32.2 30.0 - 36.5 g/dL    RDW 19.1 (H) 11.5 - 14.5 %    PLATELET 084 578 - 266 K/uL    MPV 11.4 8.9 - 12.9 FL    NRBC 0.4 (H) 0  WBC    ABSOLUTE NRBC 0.03 (H) 0.00 - 0.01 K/uL    NEUTROPHILS 78 (H) 32 - 75 %    LYMPHOCYTES 13 12 - 49 %    MONOCYTES 8 5 - 13 %    EOSINOPHILS 0 0 - 7 %    BASOPHILS 0 0 - 1 %    IMMATURE GRANULOCYTES 1 (H) 0.0 - 0.5 %    ABS. NEUTROPHILS 6.4 1.8 - 8.0 K/UL    ABS. LYMPHOCYTES 1.1 0.8 - 3.5 K/UL    ABS. MONOCYTES 0.7 0.0 - 1.0 K/UL    ABS.  EOSINOPHILS 0.0 0.0 - 0.4 K/UL    ABS. BASOPHILS 0.0 0.0 - 0.1 K/UL    ABS. IMM. GRANS. 0.1 (H) 0.00 - 0.04 K/UL    DF AUTOMATED     METABOLIC PANEL, COMPREHENSIVE    Collection Time: 02/08/20 10:48 PM   Result Value Ref Range    Sodium 121 (L) 136 - 145 mmol/L    Potassium 5.8 (H) 3.5 - 5.1 mmol/L    Chloride 89 (L) 97 - 108 mmol/L    CO2 23 21 - 32 mmol/L    Anion gap 9 5 - 15 mmol/L    Glucose 227 (H) 65 - 100 mg/dL    BUN 35 (H) 6 - 20 MG/DL    Creatinine 1.19 (H) 0.55 - 1.02 MG/DL    BUN/Creatinine ratio 29 (H) 12 - 20      GFR est AA 53 (L) >60 ml/min/1.73m2    GFR est non-AA 43 (L) >60 ml/min/1.73m2    Calcium 9.0 8.5 - 10.1 MG/DL    Bilirubin, total 1.7 (H) 0.2 - 1.0 MG/DL    ALT (SGPT) 329 (H) 12 - 78 U/L    AST (SGOT) 203 (H) 15 - 37 U/L    Alk.  phosphatase 276 (H) 45 - 117 U/L    Protein, total 6.7 6.4 - 8.2 g/dL    Albumin 3.5 3.5 - 5.0 g/dL    Globulin 3.2 2.0 - 4.0 g/dL    A-G Ratio 1.1 1.1 - 2.2     CULTURE, BLOOD, PAIRED    Collection Time: 02/08/20 10:48 PM   Result Value Ref Range    Special Requests: NO SPECIAL REQUESTS      Culture result: NO GROWTH AFTER 6 HOURS     INFLUENZA A+B VIRAL AGS    Collection Time: 02/08/20 10:48 PM   Result Value Ref Range    Influenza A Antigen NEGATIVE  NEG      Influenza B Antigen NEGATIVE  NEG     TROPONIN I    Collection Time: 02/08/20 10:48 PM   Result Value Ref Range    Troponin-I, Qt. <0.05 <0.05 ng/mL   NT-PRO BNP    Collection Time: 02/08/20 10:48 PM   Result Value Ref Range    NT pro- (H) <450 PG/ML   MAGNESIUM    Collection Time: 02/08/20 10:48 PM   Result Value Ref Range    Magnesium 2.5 (H) 1.6 - 2.4 mg/dL   TYPE & SCREEN    Collection Time: 02/08/20 10:48 PM   Result Value Ref Range    Crossmatch Expiration 02/11/2020     ABO/Rh(D) B POSITIVE     Antibody screen NEG    SAMPLES BEING HELD    Collection Time: 02/08/20 10:58 PM   Result Value Ref Range    SAMPLES BEING HELD [id number]     COMMENT        Add-on orders for these samples will be processed based on acceptable specimen integrity and analyte stability, which may vary by analyte.    EKG, 12 LEAD, INITIAL    Collection Time: 02/08/20 11:05 PM   Result Value Ref Range    Ventricular Rate 80 BPM    Atrial Rate 92 BPM    QRS Duration 156 ms    Q-T Interval 458 ms    QTC Calculation (Bezet) 528 ms    Calculated R Axis -86 degrees    Calculated T Axis 59 degrees    Diagnosis       Undetermined rhythm  Right bundle branch block  Left anterior fascicular block  ** Bifascicular block **  When compared with ECG of 05-FEB-2020 15:50,  Current undetermined rhythm precludes rhythm comparison, needs review     OCCULT BLOOD, STOOL    Collection Time: 02/08/20 11:12 PM   Result Value Ref Range    Occult blood, stool NEGATIVE  NEG     EKG, 12 LEAD, INITIAL    Collection Time: 02/09/20 12:25 AM   Result Value Ref Range    Ventricular Rate 75 BPM    Atrial Rate 300 BPM    QRS Duration 158 ms    Q-T Interval 472 ms    QTC Calculation (Bezet) 527 ms    Calculated R Axis -85 degrees    Calculated T Axis 69 degrees    Diagnosis       Wide QRS rhythm  Left axis deviation  Right bundle branch block  When compared with ECG of 08-FEB-2020 23:05,  MANUAL COMPARISON REQUIRED, DATA IS UNCONFIRMED     URINALYSIS W/ REFLEX CULTURE    Collection Time: 02/09/20 12:49 AM   Result Value Ref Range    Color DARK YELLOW      Appearance CLOUDY (A) CLEAR      Specific gravity >1.030     pH (UA) 5.0 5.0 - 8.0      Protein 300 (A) NEG mg/dL    Glucose NEGATIVE  NEG mg/dL    Ketone TRACE (A) NEG mg/dL    Blood SMALL (A) NEG      Urobilinogen 1.0 0.2 - 1.0 EU/dL    Nitrites NEGATIVE  NEG      Leukocyte Esterase TRACE (A) NEG      WBC 5-10 0 - 4 /hpf    RBC 5-10 0 - 5 /hpf    Epithelial cells FEW FEW /lpf    Bacteria NEGATIVE  NEG /hpf    UA:UC IF INDICATED URINE CULTURE ORDERED (A) CNI      Hyaline cast 0-2 0 - 5 /lpf   BILIRUBIN, CONFIRM    Collection Time: 02/09/20 12:49 AM   Result Value Ref Range    Bilirubin UA, confirm NEGATIVE  NEG     LACTIC ACID    Collection Time: 02/09/20 12:58 AM   Result Value Ref Range    Lactic acid 3.8 (HH) 0.4 - 2.0 MMOL/L   PROTHROMBIN TIME + INR    Collection Time: 02/09/20  3:28 AM   Result Value Ref Range    INR 1.2 (H) 0.9 - 1.1      Prothrombin time 12.2 (H) 9.0 - 11.1 sec   PTT    Collection Time: 02/09/20  3:28 AM   Result Value Ref Range    aPTT 26.2 22.1 - 32.0 sec    aPTT, therapeutic range     58.0 - 77.0 SECS   CELL COUNT, BODY FLUID    Collection Time: 02/09/20  4:40 AM   Result Value Ref Range    BODY FLUID TYPE PERICARDIAL FLUID      FLUID COLOR RED      FLUID APPEARANCE BLOODY      FLUID RBC CT. >100 (H) 0 /cu mm    FLUID NUCLEATED CELLS 2,985 /cu mm    FLD NEUTROPHILS 49 (A) NRRE %    FLD LYMPHS 34 (A) NRRE %    FLD MONO/MACROPHAGES 12 (A) NRRE %    FLD EOSINS 4 (A) NRRE %    FLD OTHER CELLS 1 (H) 0 %   GLUCOSE, FLUID    Collection Time: 02/09/20  4:40 AM   Result Value Ref Range    Fluid Type: PERICARDIAL FLUID      Glucose, body fld.  102 MG/DL   TSH 3RD GENERATION    Collection Time: 02/09/20  5:51 AM   Result Value Ref Range    TSH 3.16 0.36 - 3.74 uIU/mL   URINALYSIS W/ RFLX MICROSCOPIC    Collection Time: 02/09/20  5:54 AM   Result Value Ref Range    Color DARK YELLOW      Appearance CLOUDY (A) CLEAR      Specific gravity 1.020      pH (UA) 5.0 5.0 - 8.0      Protein 100 (A) NEG mg/dL    Glucose NEGATIVE  NEG mg/dL    Ketone NEGATIVE  NEG mg/dL    Bilirubin NEGATIVE  NEG      Blood MODERATE (A) NEG      Urobilinogen 1.0 0.2 - 1.0 EU/dL    Nitrites NEGATIVE  NEG      Leukocyte Esterase NEGATIVE  NEG      WBC 0-4 0 - 4 /hpf    RBC 0-5 0 - 5 /hpf    Epithelial cells FEW FEW /lpf    Bacteria NEGATIVE  NEG /hpf   CBC W/O DIFF    Collection Time: 02/09/20  5:54 AM   Result Value Ref Range    WBC 10.6 3.6 - 11.0 K/uL    RBC 3.35 (L) 3.80 - 5.20 M/uL    HGB 9.6 (L) 11.5 - 16.0 g/dL    HCT 30.9 (L) 35.0 - 47.0 %    MCV 92.2 80.0 - 99.0 FL    MCH 28.7 26.0 - 34.0 PG    MCHC 31.1 30.0 - 36.5 g/dL    RDW 19.1 (H) 11.5 - 14.5 % PLATELET 923 202 - 120 K/uL    MPV 11.2 8.9 - 12.9 FL    NRBC 0.2 (H) 0  WBC    ABSOLUTE NRBC 0.02 (H) 0.00 - 4.97 K/uL   METABOLIC PANEL, COMPREHENSIVE    Collection Time: 02/09/20  5:54 AM   Result Value Ref Range    Sodium 125 (L) 136 - 145 mmol/L    Potassium 5.6 (H) 3.5 - 5.1 mmol/L    Chloride 98 97 - 108 mmol/L    CO2 17 (L) 21 - 32 mmol/L    Anion gap 10 5 - 15 mmol/L    Glucose 193 (H) 65 - 100 mg/dL    BUN 34 (H) 6 - 20 MG/DL    Creatinine 1.07 (H) 0.55 - 1.02 MG/DL    BUN/Creatinine ratio 32 (H) 12 - 20      GFR est AA 60 (L) >60 ml/min/1.73m2    GFR est non-AA 49 (L) >60 ml/min/1.73m2    Calcium 8.4 (L) 8.5 - 10.1 MG/DL    Bilirubin, total 2.0 (H) 0.2 - 1.0 MG/DL    ALT (SGPT) 429 (H) 12 - 78 U/L    AST (SGOT) 288 (H) 15 - 37 U/L    Alk. phosphatase 219 (H) 45 - 117 U/L    Protein, total 5.4 (L) 6.4 - 8.2 g/dL    Albumin 2.7 (L) 3.5 - 5.0 g/dL    Globulin 2.7 2.0 - 4.0 g/dL    A-G Ratio 1.0 (L) 1.1 - 2.2           Urine dipstick:   Lab Results   Component Value Date/Time    Color DARK YELLOW 02/09/2020 05:54 AM    Appearance CLOUDY (A) 02/09/2020 05:54 AM    Specific gravity 1.020 02/09/2020 05:54 AM    Specific gravity 1.028 02/05/2020 07:26 PM    pH (UA) 5.0 02/09/2020 05:54 AM    Protein 100 (A) 02/09/2020 05:54 AM    Glucose NEGATIVE  02/09/2020 05:54 AM    Ketone NEGATIVE  02/09/2020 05:54 AM    Bilirubin NEGATIVE  02/09/2020 05:54 AM    Urobilinogen 1.0 02/09/2020 05:54 AM    Nitrites NEGATIVE  02/09/2020 05:54 AM    Leukocyte Esterase NEGATIVE  02/09/2020 05:54 AM    Epithelial cells FEW 02/09/2020 05:54 AM    Bacteria NEGATIVE  02/09/2020 05:54 AM    WBC 0-4 02/09/2020 05:54 AM    RBC 0-5 02/09/2020 05:54 AM       I have reviewed the following:    All pertinent labs, microbiology data, radiology imaging for my assessment     Medications list Personally Reviewed   [x]      Yes     []               No       Medications:  Prior to Admission medications    Medication Sig Start Date End Date Taking? Authorizing Provider   XARELTO 20 mg tab tablet TAKE ONE TABLET BY MOUTH DAILY 4/5/19   Thaddeus Jean Baptiste MD   amLODIPine (NORVASC) 2.5 mg tablet Take 2.5 mg by mouth. Provider, Historical   pantoprazole (PROTONIX) 20 mg tablet  1/5/19   Provider, Historical   magnesium 250 mg tab Take  by mouth. Provider, Historical   digestive enzymes cap Take  by mouth. Provider, Historical   dietary supplement cap Take  by mouth. Provider, Historical   CALCIUM-MAGNESIUM PO Take 600 mg by mouth. Provider, Historical   mecobalamin, vitamin B12, 1,000 mcg TbDi Place under the tongue. Provider, Historical   dilTIAZem CD (CARTIA XT) 120 mg ER capsule Take  by mouth daily. Provider, Historical   levothyroxine (SYNTHROID) 100 mcg tablet Take 100 mcg by mouth Daily (before breakfast). Provider, Historical   VIT C/E/ZN/COPPR/LUTEIN/ZEAXAN (PRESERVISION AREDS 2 PO) Take  by mouth. Provider, Historical   cholecalciferol (VITAMIN D3) 1,000 unit cap Take 1,000 Units by mouth daily. Provider, Historical   glucosamine (GLUCOSAMINE RELIEF) 1,000 mg tab Take  by mouth. WITH CHONDROINTIN 1500MG DAILY    Provider, Historical        Thank you for allowing us to participate in the care of this patient. We will follow patient. Please dont hesitate to call with any questions    Ac Jane MD  [facility]   [address] [address]  Phone - [phone]   Fax - [phone]  www. [facility]

## 2020-02-09 NOTE — CONSULTS
Cardiology Consult/Progress Note           2/8/2020 10:28 PM   Pericardial effusion [I31.3]   HPI: Julious Nageotte is a 78 y.o. female admitted for progressive lethargy weakness and Pericardial effusion [I31.3]. Over the past 2 weeks she has reported progressive lethargy weakness poor appetite nausea vomiting and febrile feeling. She also had a near syncopal spell about 5 days ago which was managed with IV hydration. Her oral intake has been poor for the last few days. In the last 3 days he also noted increasing tightness in the upper chest with pain on deep breathing. Over the past 24 hours she was singly lethargic poorly responsive and not able to keep anything down and was brought into the emergency room. Further evaluation identified signs of hypoperfusion, hypotension increased lactate. Chest x-ray demonstrated increased cardiac silhouette and chest CT demonstrated large pericardial effusion with small RV dimensions. Investigation  Telemetry: paced rhythm  ECG: paced phythm  Echocardiogram: bedside echo with large pericardial effusion, paritial RC collapse. Assessment and PLAN   1. History of recurrent paroxysmal A. fib  2. By V pacemaker placement for nonischemic cardiomyopathy in December 2019  3. History of nonischemic cardiomyopathy  4. Likely recent viral illness causing symptoms of nausea vomiting lethargy and hypothermia  5. Large pericardial effusion likely related to #4 with signs of impending pericardial tamponade. Mrs. Diamond Morfin was seen in the emergency room in critical condition with hypotension engorged neck veins and a large pericardial effusion with evidence of partial right atrial as well as diastolic right ventricular collapse. Given her hemodynamic circumstances it was felt reasonable to proceed with urgent pericardiocentesis as her hemodynamics did not improve with IV fluids alone. Patient has been on Eliquis which has been withheld. Andexanet Alpha was not available and I suspect urgent pericardiocentesis may be necessary. After pericardiocentesis we will try to treat her as pericarditis with initiation of nonsteroidal anti-inflammatory agents and possibly colchicine if she can tolerate it from a GI perspective. [x]    High complexity decision making was performed  [x]    Patient is at high-risk of decompensation with multiple organ involvement     Review of Symptoms:  Respiratory: No exertional dyspnea, orthopnea, PND, cough, hemoptysis, URI. Cardiovascular: No CP, palpitations, sweating, lightheadedness, dizziness, syncope, presyncope, lower extremity swelling. Otherwise no other pertinent positive or negative symptoms on ROS.      Patient Active Problem List    Diagnosis Date Noted    Pericardial effusion 02/09/2020    Acquired hypothyroidism 07/12/2017    Controlled type 2 diabetes mellitus without complication ([address] 00/71/7573    [facility]     Neck pain on right side 01/03/2014    Polymyalgia rheumatica ([facility] 01/03/2014    Thyroid nodule 01/03/2014    Hypertension 08/14/2009      Owen Skelton MD  Past Medical History:   Diagnosis Date    A-fib [facility])     Arthritis     OA    Diabetes ([address]     PRE DIABETIC    GERD (gastroesophageal reflux disease)     Hypertension     Ill-defined condition     MACULAR DEGENERATION    Polymyalgia ([address]     Thyroid disease       Past Surgical History:   Procedure Laterality Date    HX APPENDECTOMY      HX BLEPHAROPLASTY      HX CATARACT REMOVAL Bilateral     HX DILATION AND CURETTAGE      HX ORTHOPAEDIC      RIGHT AND LEFT ARTHROSCOPIC SURGERY    HX ORTHOPAEDIC      RIGHT AND LEFT KNEE PARTIAL REPLACEMENTS JOINTS     Social History     Socioeconomic History    Marital status:      Spouse name: Not on file    Number of children: Not on file    Years of education: Not on file    Highest education level: Not on file   Tobacco Use    Smoking status: Former Smoker    Smokeless tobacco: Never Used   Substance and Sexual Activity    Alcohol use: Yes     Alcohol/week: 1.0 standard drinks     Types: 1 Glasses of wine per week     Comment: RARE    Drug use: No     Family History   Problem Relation Age of Onset    Stroke Mother     Diabetes Mother     Migraines Mother     Cancer Father         PROSTATE CANCER AND COLON CANCER    Liver Disease Father         HAD BLOOD TRANSFUSION,  OF CIRRHOSIS OF LIVER, WAS NON DRINKER    Diabetes Brother     Anesth Problems Neg Hx       Current Facility-Administered Medications   Medication Dose Route Frequency    sodium chloride (NS) flush 5-40 mL  5-40 mL IntraVENous Q8H    sodium chloride (NS) flush 5-40 mL  5-40 mL IntraVENous PRN    acetaminophen (TYLENOL) tablet 650 mg  650 mg Oral Q4H PRN    ondansetron (ZOFRAN) injection 4 mg  4 mg IntraVENous Q4H PRN    magnesium hydroxide (MILK OF MAGNESIA) 400 mg/5 mL oral suspension 30 mL  30 mL Oral DAILY PRN    famotidine (PF) (PEPCID) 20 mg in 0.9% sodium chloride 10 mL injection  20 mg IntraVENous Q12H    human prothrombin complex [facility]) 4-factor 3,432 Int'l Units (KCENTRA) infusion  3,432 Int'l Units IntraVENous ONCE    lidocaine (LIDODERM) 5 % patch 2 Patch  2 Patch TransDERmal Q24H    sodium chloride (NS) flush 5-10 mL  5-10 mL IntraVENous PRN      Prior to Admission Medications   Prescriptions Last Dose Informant Patient Reported? Taking? CALCIUM-MAGNESIUM PO   Yes No   Sig: Take 600 mg by mouth. VIT C/E/ZN/COPPR/LUTEIN/ZEAXAN (PRESERVISION AREDS 2 PO)   Yes No   Sig: Take  by mouth. XARELTO 20 mg tab tablet   No No   Sig: TAKE ONE TABLET BY MOUTH DAILY   amLODIPine (NORVASC) 2.5 mg tablet   Yes No   Sig: Take 2.5 mg by mouth. cholecalciferol (VITAMIN D3) 1,000 unit cap   Yes No   Sig: Take 1,000 Units by mouth daily. dietary supplement cap   Yes No   Sig: Take  by mouth. digestive enzymes cap   Yes No   Sig: Take  by mouth.    dilTIAZem CD (CARTIA XT) 120 mg ER capsule   Yes No   Sig: Take  by mouth daily. glucosamine (GLUCOSAMINE RELIEF) 1,000 mg tab   Yes No   Sig: Take  by mouth. WITH CHONDROINTIN 1500MG DAILY   levothyroxine (SYNTHROID) 100 mcg tablet   Yes No   Sig: Take 100 mcg by mouth Daily (before breakfast). magnesium 250 mg tab   Yes No   Sig: Take  by mouth.   mecobalamin, vitamin B12, 1,000 mcg TbDi   Yes No   Sig: Place under the tongue. pantoprazole (PROTONIX) 20 mg tablet   Yes No      Facility-Administered Medications: None      Allergies   Allergen Reactions    Ciprofloxacin Myalgia    Quinolones Other (comments)     EYES CAN'T FOCUS, PATIENT CANT WALK        Labs:   Recent Results (from the past 24 hour(s))   LACTIC ACID    Collection Time: 02/08/20 10:40 PM   Result Value Ref Range    Lactic acid 3.1 (HH) 0.4 - 2.0 MMOL/L   CBC WITH AUTOMATED DIFF    Collection Time: 02/08/20 10:48 PM   Result Value Ref Range    WBC 8.3 3.6 - 11.0 K/uL    RBC 3.67 (L) 3.80 - 5.20 M/uL    HGB 10.5 (L) 11.5 - 16.0 g/dL    HCT 32.6 (L) 35.0 - 47.0 %    MCV 88.8 80.0 - 99.0 FL    MCH 28.6 26.0 - 34.0 PG    MCHC 32.2 30.0 - 36.5 g/dL    RDW 19.1 (H) 11.5 - 14.5 %    PLATELET 672 404 - 035 K/uL    MPV 11.4 8.9 - 12.9 FL    NRBC 0.4 (H) 0  WBC    ABSOLUTE NRBC 0.03 (H) 0.00 - 0.01 K/uL    NEUTROPHILS 78 (H) 32 - 75 %    LYMPHOCYTES 13 12 - 49 %    MONOCYTES 8 5 - 13 %    EOSINOPHILS 0 0 - 7 %    BASOPHILS 0 0 - 1 %    IMMATURE GRANULOCYTES 1 (H) 0.0 - 0.5 %    ABS. NEUTROPHILS 6.4 1.8 - 8.0 K/UL    ABS. LYMPHOCYTES 1.1 0.8 - 3.5 K/UL    ABS. MONOCYTES 0.7 0.0 - 1.0 K/UL    ABS. EOSINOPHILS 0.0 0.0 - 0.4 K/UL    ABS. BASOPHILS 0.0 0.0 - 0.1 K/UL    ABS. IMM.  GRANS. 0.1 (H) 0.00 - 0.04 K/UL    DF AUTOMATED     METABOLIC PANEL, COMPREHENSIVE    Collection Time: 02/08/20 10:48 PM   Result Value Ref Range    Sodium 121 (L) 136 - 145 mmol/L    Potassium 5.8 (H) 3.5 - 5.1 mmol/L    Chloride 89 (L) 97 - 108 mmol/L    CO2 23 21 - 32 mmol/L    Anion gap 9 5 - 15 mmol/L Glucose 227 (H) 65 - 100 mg/dL    BUN 35 (H) 6 - 20 MG/DL    Creatinine 1.19 (H) 0.55 - 1.02 MG/DL    BUN/Creatinine ratio 29 (H) 12 - 20      GFR est AA 53 (L) >60 ml/min/1.73m2    GFR est non-AA 43 (L) >60 ml/min/1.73m2    Calcium 9.0 8.5 - 10.1 MG/DL    Bilirubin, total 1.7 (H) 0.2 - 1.0 MG/DL    ALT (SGPT) 329 (H) 12 - 78 U/L    AST (SGOT) 203 (H) 15 - 37 U/L    Alk. phosphatase 276 (H) 45 - 117 U/L    Protein, total 6.7 6.4 - 8.2 g/dL    Albumin 3.5 3.5 - 5.0 g/dL    Globulin 3.2 2.0 - 4.0 g/dL    A-G Ratio 1.1 1.1 - 2.2     INFLUENZA A+B VIRAL AGS    Collection Time: 02/08/20 10:48 PM   Result Value Ref Range    Influenza A Antigen NEGATIVE  NEG      Influenza B Antigen NEGATIVE  NEG     TROPONIN I    Collection Time: 02/08/20 10:48 PM   Result Value Ref Range    Troponin-I, Qt. <0.05 <0.05 ng/mL   NT-PRO BNP    Collection Time: 02/08/20 10:48 PM   Result Value Ref Range    NT pro- (H) <450 PG/ML   MAGNESIUM    Collection Time: 02/08/20 10:48 PM   Result Value Ref Range    Magnesium 2.5 (H) 1.6 - 2.4 mg/dL   TYPE & SCREEN    Collection Time: 02/08/20 10:48 PM   Result Value Ref Range    Crossmatch Expiration 02/11/2020     ABO/Rh(D) B POSITIVE     Antibody screen NEG    SAMPLES BEING HELD    Collection Time: 02/08/20 10:58 PM   Result Value Ref Range    SAMPLES BEING HELD [id number]     COMMENT        Add-on orders for these samples will be processed based on acceptable specimen integrity and analyte stability, which may vary by analyte.    EKG, 12 LEAD, INITIAL    Collection Time: 02/08/20 11:05 PM   Result Value Ref Range    Ventricular Rate 80 BPM    Atrial Rate 92 BPM    QRS Duration 156 ms    Q-T Interval 458 ms    QTC Calculation (Bezet) 528 ms    Calculated R Axis -86 degrees    Calculated T Axis 59 degrees    Diagnosis       Undetermined rhythm  Right bundle branch block  Left anterior fascicular block  ** Bifascicular block **  When compared with ECG of 05-FEB-2020 15:50,  Current undetermined rhythm precludes rhythm comparison, needs review     OCCULT BLOOD, STOOL    Collection Time: 02/08/20 11:12 PM   Result Value Ref Range    Occult blood, stool NEGATIVE  NEG     EKG, 12 LEAD, INITIAL    Collection Time: 02/09/20 12:25 AM   Result Value Ref Range    Ventricular Rate 75 BPM    Atrial Rate 300 BPM    QRS Duration 158 ms    Q-T Interval 472 ms    QTC Calculation (Bezet) 527 ms    Calculated R Axis -85 degrees    Calculated T Axis 69 degrees    Diagnosis       Wide QRS rhythm  Left axis deviation  Right bundle branch block  When compared with ECG of 08-FEB-2020 23:05,  MANUAL COMPARISON REQUIRED, DATA IS UNCONFIRMED     URINALYSIS W/ REFLEX CULTURE    Collection Time: 02/09/20 12:49 AM   Result Value Ref Range    Color DARK YELLOW      Appearance CLOUDY (A) CLEAR      Specific gravity >1.030     pH (UA) 5.0 5.0 - 8.0      Protein 300 (A) NEG mg/dL    Glucose NEGATIVE  NEG mg/dL    Ketone TRACE (A) NEG mg/dL    Blood SMALL (A) NEG      Urobilinogen 1.0 0.2 - 1.0 EU/dL    Nitrites NEGATIVE  NEG      Leukocyte Esterase TRACE (A) NEG      WBC 5-10 0 - 4 /hpf    RBC 5-10 0 - 5 /hpf    Epithelial cells FEW FEW /lpf    Bacteria NEGATIVE  NEG /hpf    UA:UC IF INDICATED URINE CULTURE ORDERED (A) CNI      Hyaline cast 0-2 0 - 5 /lpf   BILIRUBIN, CONFIRM    Collection Time: 02/09/20 12:49 AM   Result Value Ref Range    Bilirubin UA, confirm NEGATIVE  NEG     LACTIC ACID    Collection Time: 02/09/20 12:58 AM   Result Value Ref Range    Lactic acid 3.8 (HH) 0.4 - 2.0 MMOL/L   PROTHROMBIN TIME + INR    Collection Time: 02/09/20  3:28 AM   Result Value Ref Range    INR 1.2 (H) 0.9 - 1.1      Prothrombin time 12.2 (H) 9.0 - 11.1 sec   PTT    Collection Time: 02/09/20  3:28 AM   Result Value Ref Range    aPTT 26.2 22.1 - 32.0 sec    aPTT, therapeutic range     58.0 - 77.0 SECS       Intake/Output Summary (Last 24 hours) at 2/9/2020 7506  Last data filed at 2/9/2020 0101  Gross per 24 hour   Intake 2333 ml   Output -- Net 2333 ml      Patient Vitals for the past 24 hrs:   Temp Pulse Resp BP SpO2   02/09/20 0314 95.8 °F (35.4 °C) -- -- -- --   02/09/20 0300 -- 75 18 99/68 --   02/09/20 0248 -- 77 17 98/70 95 %   02/09/20 0233 -- 76 19 (!) 79/58 95 %   02/09/20 0230 -- 76 13 94/64 92 %   02/09/20 0145 -- 75 18 107/71 92 %   02/09/20 0130 -- 75 16 108/73 --   02/09/20 0115 -- 76 16 (!) 78/61 --   02/09/20 0104 -- 77 16 107/75 100 %   02/09/20 0100 -- 81 18 (!) 87/73 --   02/09/20 0045 -- -- -- 111/83 --   02/09/20 0039 -- 75 19 121/77 96 %   02/09/20 0030 -- 75 17 99/69 90 %   02/09/20 0017 -- 75 15 101/74 100 %   02/08/20 2345 -- 75 20 90/74 99 %   02/08/20 2330 -- 77 18 (!) 86/65 --   02/08/20 2327 -- -- -- (!) 85/61 95 %   02/08/20 2315 -- 76 17 (!) 77/64 --   02/08/20 2312 -- 80 19 (!) 78/61 --   02/08/20 2304 -- 76 21 (!) 74/49 99 %   02/08/20 2245 -- 68 -- (!) 78/60 100 %   02/08/20 2236 97.5 °F (36.4 °C) 68 -- 90/55 97 %      General:    Pale, drowsy and in distress. Psychiatric:    Normal Mood and affect    Eye/ENT:      Pupils equal, No asymmetry, Conjunctival pink. Able to hear voice at normal amplitude   Lungs:      Visibly symmetric chest expansion, No palpable tenderness. Clear to auscultation bilaterally. Heart[de-identified]    Regular rate and rhythm, S1, S2 normal, no murmur, click, rub or gallop. No JVD, Normal palpable peripheral pulses. No cyanosis. Significantly engorged neck veins   Abdomen:     Soft, non-tender. Bowel sounds normal. No masses,  No      organomegaly. Extremities:   Extremities normal, atraumatic, no edema. Neurologic:   CN II-XII grossly intact.  No gross focal deficits         Christen Love MD  2/9/2020   4:18 AM     [facility] Office:   [address] [facility] Office:  [address]    [place] [address]  P: [phone]    P: [phone]  F: [phone]    F: [phone]

## 2020-02-09 NOTE — PROCEDURES
Patient was brought down to the Cath Lab for emergent pericardiocentesis. Prior to this informed consent was obtained. Subcostal and pericardial regions were appropriately draped. Under ultrasound and fluoroscopic guidance, a micropuncture needle was used to enter the pericardium and initial wire position was confirmed with ultrasound and fluoroscopy. Micropuncture system was subsequently upsized to a 6 Albanian pigtail catheter which was used to drain a total of 1100 cc of serosanguinous pericardial fluid a part of which was sent for further analysis. Pigtail drainage catheter was secured in position with sutures in place to dependent drainage with plan for active aspiration every 4 hourly. Patient was transferred out of the Cath Lab in stable condition.

## 2020-02-09 NOTE — PROGRESS NOTES
Primary Nurse Remi Kuo, CHALINO and phillip RN performed a dual skin assessment on this patient No impairment noted Current Bed: PATRIC Jeana Mitchell score is 18

## 2020-02-09 NOTE — ED PROVIDER NOTES
HPI the patient has a 5-day history of progressive symptoms including bilateral shoulder pain, chest pain, back pain, occasional vomiting and diarrhea, chills and generalized weakness. There has also been times of confusion. Past Medical History:   Diagnosis Date    [facility]     Arthritis     OA    Diabetes ([facility]     PRE DIABETIC    GERD (gastroesophageal reflux disease)     Hypertension     Ill-defined condition     MACULAR DEGENERATION    Polymyalgia ([facility]     Thyroid disease        Past Surgical History:   Procedure Laterality Date    HX APPENDECTOMY      HX BLEPHAROPLASTY      HX CATARACT REMOVAL Bilateral     HX DILATION AND CURETTAGE      HX ORTHOPAEDIC      RIGHT AND LEFT ARTHROSCOPIC SURGERY    HX ORTHOPAEDIC      RIGHT AND LEFT KNEE PARTIAL REPLACEMENTS JOINTS         Family History:   Problem Relation Age of Onset    Stroke Mother     Diabetes Mother     Migraines Mother     Cancer Father         PROSTATE CANCER AND COLON CANCER    Liver Disease Father         HAD BLOOD TRANSFUSION,  OF CIRRHOSIS OF LIVER, WAS NON DRINKER    Diabetes Brother     Anesth Problems Neg Hx        Social History     Socioeconomic History    Marital status:      Spouse name: Not on file    Number of children: Not on file    Years of education: Not on file    Highest education level: Not on file   Occupational History    Not on file   Social Needs    Financial resource strain: Not on file    Food insecurity:     Worry: Not on file     Inability: Not on file    Transportation needs:     Medical: Not on file     Non-medical: Not on file   Tobacco Use    Smoking status: Former Smoker    Smokeless tobacco: Never Used   Substance and Sexual Activity    Alcohol use:  Yes     Alcohol/week: 1.0 standard drinks     Types: 1 Glasses of wine per week     Comment: RARE    Drug use: No    Sexual activity: Not on file   Lifestyle    Physical activity:     Days per week: Not on file     Minutes per session: Not on file    Stress: Not on file   Relationships    Social connections:     Talks on phone: Not on file     Gets together: Not on file     Attends [employer] service: Not on file     Active member of club or organization: Not on file     Attends meetings of clubs or organizations: Not on file     Relationship status: Not on file    Intimate partner violence:     Fear of current or ex partner: Not on file     Emotionally abused: Not on file     Physically abused: Not on file     Forced sexual activity: Not on file   Other Topics Concern    Not on file   Social History Narrative    Not on file         ALLERGIES: Ciprofloxacin and Quinolones    Review of Systems   Constitutional: Positive for chills. Negative for fever. Respiratory: Positive for cough. Negative for shortness of breath and wheezing. Cardiovascular: Positive for chest pain. Negative for leg swelling. Gastrointestinal: Positive for abdominal pain, diarrhea, nausea and vomiting. Negative for blood in stool. Genitourinary: Negative for dysuria. Musculoskeletal: Positive for back pain. Negative for neck stiffness. Skin: Negative for rash. Neurological: Negative. Negative for syncope and headaches. Psychiatric/Behavioral: Positive for confusion. All other systems reviewed and are negative. Vitals:    02/08/20 2236   BP: 90/55   Temp: 97.5 °F (36.4 °C)   SpO2: 97%            Physical Exam  Constitutional:       General: She is not in acute distress. Appearance: She is well-developed. HENT:      Head: Normocephalic. Mouth/Throat:      Comments: Pale mm  Eyes:      Pupils: Pupils are equal, round, and reactive to light. Neck:      Musculoskeletal: Normal range of motion. Cardiovascular:      Rate and Rhythm: Normal rate. Heart sounds: No murmur. Pulmonary:      Effort: Pulmonary effort is normal.      Comments: ? Occ. Rales in bases  Abdominal:      Palpations: Abdomen is soft.       Tenderness: There is no abdominal tenderness. Genitourinary:     Comments: Normal color stool on rectal exam.  No gross blood  Musculoskeletal: Normal range of motion. Comments: Mild bilat leg edema   Skin:     General: Skin is warm and dry. Capillary Refill: Capillary refill takes less than 2 seconds. Neurological:      General: No focal deficit present. Mental Status: She is alert. Cranial Nerves: No cranial nerve deficit. Psychiatric:         Behavior: Behavior normal.          MDM       Procedures ED EKG interpretation:  Rhythm:  Paced rhythm rate 80. This EKG was interpreted by Moise Diamond MD,ED Provider.

## 2020-02-09 NOTE — PROGRESS NOTES
0600 pt arrived to unit, VSS, emptied drainage bag from pericardial drain, unable to get tempature, Raegan Bajwa applied    0615 Urinalysis & labs drawn and sent to lab    0630 temp sensing Craig placed     0730 Bedside shift change report given to [address] [place] [address] (oncoming nurse) by Kim Barillas RN (offgoing nurse). Report included the following information SBAR, Kardex, ED Summary, Procedure Summary, Intake/Output, MAR, Recent Results and Cardiac Rhythm Paced.      0800 received call from Dr Anastacio Carbajal, orders received to redraw BMP @ 10 am.

## 2020-02-09 NOTE — PROGRESS NOTES
[facility]    ICU TEAM Progress Note    Name: Binu Diana   : 1940   MRN: [id number]   Date: 2020      Assessment/Plan:     ICU Problems:    1. Cardiac tamponade -  pericardiocentesis with ~1.1L serosanguinous drainage. Cardiology following. Suspect either slow leak post pacer vs viral infection. Will send ESR and CRP. Continue to monitor drain output. Send respiratory panel  2. HTN - Now hypertensive post drainage. Will resume PTA Coreg and diuresis. 3. Hyperkalemia and Hyponatremia - Likely volume overload. Will attempt diuresis. Renal consulted. 4. DM- ISS and hypoglycemic protocol  5. Atrial Fibrillation - Rate controlled. Post pacer. Previously on eliquis. On hold due to effusion. Defer to cardiology. 6. Non-ischemic cardiomyopathy      Subjective:   Progress Note: 2020      Reason for ICU Admission: Pericardial Effusion     Overnight Events: Patient with a-fib, thyroid disease, NICM, recent BiV pacer placement (Dec 2019), and DM who presented with generalized malaise and shock. Found with large pericardial effusion leading to tamponade physiology. She is now s/p pericardiocentesis with improvement on symptoms.      POD:Day of Surgery    S/P: Procedure(s):  Pericardiocentesis    Active Problem List:     Problem List  Date Reviewed: 1/10/2019          Codes Class    Pericardial effusion ICD-10-CM: I31.3  ICD-9-CM: 423.9         A-fib ([facility] ICD-10-CM: I48.91  ICD-9-CM: 427.31         Acquired hypothyroidism ICD-10-CM: E03.9  ICD-9-CM: 244.9         Controlled type 2 diabetes mellitus without complication ([facility] [id number]: E11.9  ICD-9-CM: 250.00         Neck pain on right side ICD-10-CM: M54.2  ICD-9-CM: 723.1         Polymyalgia rheumatica ([facility] ICD-10-CM: M35.3  ICD-9-CM: 561         Thyroid nodule ICD-10-CM: E04.1  ICD-9-CM: 241.0         Hypertension ICD-10-CM: I10  ICD-9-CM: 401.9     Overview Signed 2017  8:40 AM by Danilo MAXWELL     Overview: Past Medical History:      has a past medical history of A-fib ([facility] Arthritis, Diabetes ([facility] GERD (gastroesophageal reflux disease), Hypertension, Ill-defined condition, Polymyalgia ([facility] and Thyroid disease. Past Surgical History:      has a past surgical history that includes hx appendectomy; hx dilation and curettage; hx cataract removal (Bilateral); hx blepharoplasty; hx orthopaedic; and hx orthopaedic. Home Medications:     Prior to Admission medications    Medication Sig Start Date End Date Taking? Authorizing Provider   XARELTO 20 mg tab tablet TAKE ONE TABLET BY MOUTH DAILY 4/5/19   Kaleigh Rice MD   amLODIPine (NORVASC) 2.5 mg tablet Take 2.5 mg by mouth. Provider, Historical   pantoprazole (PROTONIX) 20 mg tablet  1/5/19   Provider, Historical   magnesium 250 mg tab Take  by mouth. Provider, Historical   digestive enzymes cap Take  by mouth. Provider, Historical   dietary supplement cap Take  by mouth. Provider, Historical   CALCIUM-MAGNESIUM PO Take 600 mg by mouth. Provider, Historical   mecobalamin, vitamin B12, 1,000 mcg TbDi Place under the tongue. Provider, Historical   dilTIAZem CD (CARTIA XT) 120 mg ER capsule Take  by mouth daily. Provider, Historical   levothyroxine (SYNTHROID) 100 mcg tablet Take 100 mcg by mouth Daily (before breakfast). Provider, Historical   VIT C/E/ZN/COPPR/LUTEIN/ZEAXAN (PRESERVISION AREDS 2 PO) Take  by mouth. Provider, Historical   cholecalciferol (VITAMIN D3) 1,000 unit cap Take 1,000 Units by mouth daily. Provider, Historical   glucosamine (GLUCOSAMINE RELIEF) 1,000 mg tab Take  by mouth. WITH CHONDROINTIN 1500MG DAILY    Provider, Historical       Allergies/Social/Family History:      Allergies   Allergen Reactions    Ciprofloxacin Myalgia    Quinolones Other (comments)     EYES CAN'T FOCUS, PATIENT CANT WALK       Social History     Tobacco Use    Smoking status: Former Smoker    Smokeless tobacco: Never Used Substance Use Topics    Alcohol use: Yes     Alcohol/week: 1.0 standard drinks     Types: 1 Glasses of wine per week     Comment: RARE      Family History   Problem Relation Age of Onset    Stroke Mother     Diabetes Mother     Migraines Mother     Cancer Father         PROSTATE CANCER AND COLON CANCER    Liver Disease Father         HAD BLOOD TRANSFUSION,  OF CIRRHOSIS OF LIVER, WAS NON DRINKER    Diabetes Brother     Anesth Problems Neg Hx        Review of Systems:     Pertinent items are noted in HPI. Objective:   Vital Signs:  Visit Vitals  /86 (BP 1 Location: Right arm, BP Patient Position: At rest)   Pulse 76   Temp 97.5 °F (36.4 °C)   Resp 14   Ht 5' 5\" (1.651 m)   Wt 74.9 kg (165 lb 2 oz)   SpO2 96%   BMI 27.48 kg/m²    O2 Flow Rate (L/min): 2 l/min O2 Device: Nasal cannula Temp (24hrs), Av.5 °F (35.8 °C), Min:95.2 °F (35.1 °C), Max:97.5 °F (36.4 °C)           Intake/Output:     Intake/Output Summary (Last 24 hours) at 2020 0906  Last data filed at 2020 0800  Gross per 24 hour   Intake 2333 ml   Output 1980 ml   Net 353 ml       Physical Exam:    General: NAD  HENT: Atraumatic  Cardio: RRR  Respiratory: CTA x 2  GI: Soft not tender abdomen  Extremities: -ve edema  Neuro: Grossly intact      LABS AND  DATA: Personally reviewed  Recent Labs     20  0554 20  2248   WBC 10.6 8.3   HGB 9.6* 10.5*   HCT 30.9* 32.6*    194     Recent Labs     20  0554 20  2248   * 121*   K 5.6* 5.8*   CL 98 89*   CO2 17* 23   BUN 34* 35*   CREA 1.07* 1.19*   * 227*   CA 8.4* 9.0   MG  --  2.5*     Recent Labs     20  0554 20  2248   SGOT 288* 203*   * 276*   TP 5.4* 6.7   ALB 2.7* 3.5   GLOB 2.7 3.2     Recent Labs     20  0328   INR 1.2*   PTP 12.2*   APTT 26.2      No results for input(s): PHI, PCO2I, PO2I, FIO2I in the last 72 hours.   Recent Labs     20  4568   TROIQ <0.05       Hemodynamics:   PAP:   CO:     Wedge:   CI: CVP:    SVR:       PVR:       Ventilator Settings:  Mode Rate Tidal Volume Pressure FiO2 PEEP                    Peak airway pressure:      Minute ventilation:          MEDS: Reviewed    Chest X-Ray:  CXR Results  (Last 48 hours)               02/08/20 2318  XR CHEST PORT Final result    Impression:  IMPRESSION: No Acute Disease. Narrative:  EXAM: Portable CXR. 2302 hours         INDICATION: Chest Pain       FINDINGS:   The lungs are clear. Heart is large. Pacemaker is present. There is no pulmonary   edema. There is no evident pneumothorax, adenopathy or pleural effusion. Multidisciplinary Rounds Completed:  No    ABCDEF Bundle/Checklist  Pain Medications: Lidoderm Patches  Target RASS: N/A  Sedation Medications: N/A  CAM-ICU:  Negative  Mobility: Good     Restraints: None needed at this time  Discussed Plan of Care (goals of care): Yes  Addressed Code Status: Partial Code    CARDIOVASCULAR  Cardiac Gtts: Nicardipine (Cardene)  SBP Goal of: > 90 mmHg and < 160 mmHg  MAP Goal of: < 90 mmHg  Transfusion Trigger (Hgb): <7 g/dL    RESPIRATORY  Vent Goals:   N/A  DVT Prophylaxis (if no, list reason): SCD's or Sequential Compression Device   SPO2 Goal: > 92%  Pulmonary toilet: Incentive Spirometry     GI/  Craig Catheter Present: Yes  GI Prophylaxis: Not at this time   Nutrition: Pending   Bowel Movement: No  Bowel Regimen: None needed at this time  Insulin: ISS    ANTIBIOTICS  Antibiotics:  None    T/L/D  Tubes: None  Lines: Peripheral IV  Drains: pericardial drain    SPECIAL EQUIPMENT  None    DISPOSITION  Stay in ICU    CRITICAL CARE CONSULTANT NOTE  I had a face to face encounter with the patient, reviewed and interpreted patient data including clinical events, labs, images, vital signs, I/O's, and examined patient.   I have discussed the case and the plan and management of the patient's care with the consulting services, the bedside nurses and the respiratory therapist.      NOTE OF PERSONAL INVOLVEMENT IN CARE   This patient has a high probability of imminent, clinically significant deterioration, which requires the highest level of preparedness to intervene urgently. I participated in the decision-making and personally managed or directed the management of the following life and organ supporting interventions that required my frequent assessment to treat or prevent imminent deterioration. I personally spent 30 minutes of critical care time. This is time spent at this critically ill patient's bedside actively involved in patient care as well as the coordination of care and discussions with the patient's family. This does not include any procedural time which has been billed separately.     Darion Ferrer MD  Staff [address]  2/9/2020

## 2020-02-09 NOTE — ED TRIAGE NOTES
Arrives from home with c/o of increased generalized weakness and fatigue that began on Wed. C/o of decreased appetite, loose stools,  vomiting, and muscular pain fatigue, \"junky cough\" and chills.  Was seen on wed after a near syncopal episode

## 2020-02-09 NOTE — ED NOTES
Daughter reporting that back pain is now radiating to chest. Pt pointed to neck and midsternal chest as sites of pain. Rates 10/10 pain MD notified. Orders for repeat EKG received.

## 2020-02-10 ENCOUNTER — APPOINTMENT (OUTPATIENT)
Dept: NON INVASIVE DIAGNOSTICS | Age: 80
DRG: 314 | End: 2020-02-10
Attending: NURSE PRACTITIONER
Payer: MEDICARE

## 2020-02-10 LAB
ALBUMIN SERPL-MCNC: 2.6 G/DL (ref 3.5–5)
ALBUMIN/GLOB SERPL: 1 {RATIO} (ref 1.1–2.2)
ALP SERPL-CCNC: 192 U/L (ref 45–117)
ALT SERPL-CCNC: 387 U/L (ref 12–78)
ANION GAP SERPL CALC-SCNC: 5 MMOL/L (ref 5–15)
AST SERPL-CCNC: 201 U/L (ref 15–37)
BACTERIA SPEC CULT: NORMAL
BASOPHILS # BLD: 0 K/UL (ref 0–0.1)
BASOPHILS NFR BLD: 0 % (ref 0–1)
BILIRUB SERPL-MCNC: 0.5 MG/DL (ref 0.2–1)
BUN SERPL-MCNC: 22 MG/DL (ref 6–20)
BUN/CREAT SERPL: 35 (ref 12–20)
CALCIUM SERPL-MCNC: 8.8 MG/DL (ref 8.5–10.1)
CHLORIDE SERPL-SCNC: 102 MMOL/L (ref 97–108)
CO2 SERPL-SCNC: 24 MMOL/L (ref 21–32)
CREAT SERPL-MCNC: 0.62 MG/DL (ref 0.55–1.02)
DIFFERENTIAL METHOD BLD: ABNORMAL
ECHO AO ROOT DIAM: 3.05 CM
ECHO LA MAJOR AXIS: 3.59 CM
ECHO LA TO AORTIC ROOT RATIO: 1.18
ECHO LV EDV A2C: 49.7 ML
ECHO LV EDV A4C: 76.3 ML
ECHO LV EDV BP: 66.7 ML (ref 56–104)
ECHO LV EDV INDEX A4C: 42.1 ML/M2
ECHO LV EDV INDEX BP: 36.8 ML/M2
ECHO LV EDV NDEX A2C: 27.4 ML/M2
ECHO LV EJECTION FRACTION A2C: 53 %
ECHO LV EJECTION FRACTION A4C: 47 %
ECHO LV EJECTION FRACTION BIPLANE: 53.2 % (ref 55–100)
ECHO LV ESV A2C: 23.5 ML
ECHO LV ESV A4C: 40.4 ML
ECHO LV ESV BP: 31.2 ML (ref 19–49)
ECHO LV ESV INDEX A2C: 13 ML/M2
ECHO LV ESV INDEX A4C: 22.3 ML/M2
ECHO LV ESV INDEX BP: 17.2 ML/M2
ECHO LV INTERNAL DIMENSION DIASTOLIC: 4.44 CM (ref 3.9–5.3)
ECHO LV INTERNAL DIMENSION SYSTOLIC: 3.2 CM
ECHO LV IVSD: 0.98 CM (ref 0.6–0.9)
ECHO LV MASS 2D: 162.8 G (ref 67–162)
ECHO LV MASS INDEX 2D: 89.7 G/M2 (ref 43–95)
ECHO LV POSTERIOR WALL DIASTOLIC: 0.94 CM (ref 0.6–0.9)
ECHO RV INTERNAL DIMENSION: 2.59 CM
EOSINOPHIL # BLD: 0 K/UL (ref 0–0.4)
EOSINOPHIL NFR BLD: 0 % (ref 0–7)
ERYTHROCYTE [DISTWIDTH] IN BLOOD BY AUTOMATED COUNT: 19.5 % (ref 11.5–14.5)
GLOBULIN SER CALC-MCNC: 2.7 G/DL (ref 2–4)
GLUCOSE BLD STRIP.AUTO-MCNC: 119 MG/DL (ref 65–100)
GLUCOSE BLD STRIP.AUTO-MCNC: 133 MG/DL (ref 65–100)
GLUCOSE BLD STRIP.AUTO-MCNC: 145 MG/DL (ref 65–100)
GLUCOSE BLD STRIP.AUTO-MCNC: 148 MG/DL (ref 65–100)
GLUCOSE SERPL-MCNC: 114 MG/DL (ref 65–100)
HCT VFR BLD AUTO: 28.6 % (ref 35–47)
HGB BLD-MCNC: 9.3 G/DL (ref 11.5–16)
IMM GRANULOCYTES # BLD AUTO: 0.1 K/UL (ref 0–0.04)
IMM GRANULOCYTES NFR BLD AUTO: 1 % (ref 0–0.5)
LVFS 2D: 27.93 %
LYMPHOCYTES # BLD: 1.5 K/UL (ref 0.8–3.5)
LYMPHOCYTES NFR BLD: 15 % (ref 12–49)
MCH RBC QN AUTO: 29 PG (ref 26–34)
MCHC RBC AUTO-ENTMCNC: 32.5 G/DL (ref 30–36.5)
MCV RBC AUTO: 89.1 FL (ref 80–99)
MONOCYTES # BLD: 0.9 K/UL (ref 0–1)
MONOCYTES NFR BLD: 10 % (ref 5–13)
NEUTS SEG # BLD: 7 K/UL (ref 1.8–8)
NEUTS SEG NFR BLD: 74 % (ref 32–75)
NRBC # BLD: 0 K/UL (ref 0–0.01)
NRBC BLD-RTO: 0 PER 100 WBC
PLATELET # BLD AUTO: 183 K/UL (ref 150–400)
PMV BLD AUTO: 11.1 FL (ref 8.9–12.9)
POTASSIUM SERPL-SCNC: 4.3 MMOL/L (ref 3.5–5.1)
PROT SERPL-MCNC: 5.3 G/DL (ref 6.4–8.2)
RBC # BLD AUTO: 3.21 M/UL (ref 3.8–5.2)
SERVICE CMNT-IMP: ABNORMAL
SERVICE CMNT-IMP: NORMAL
SODIUM SERPL-SCNC: 131 MMOL/L (ref 136–145)
WBC # BLD AUTO: 9.6 K/UL (ref 3.6–11)

## 2020-02-10 PROCEDURE — 85025 COMPLETE CBC W/AUTO DIFF WBC: CPT

## 2020-02-10 PROCEDURE — 36415 COLL VENOUS BLD VENIPUNCTURE: CPT

## 2020-02-10 PROCEDURE — 82962 GLUCOSE BLOOD TEST: CPT

## 2020-02-10 PROCEDURE — 65660000000 HC RM CCU STEPDOWN

## 2020-02-10 PROCEDURE — 94760 N-INVAS EAR/PLS OXIMETRY 1: CPT

## 2020-02-10 PROCEDURE — 80053 COMPREHEN METABOLIC PANEL: CPT

## 2020-02-10 PROCEDURE — 93308 TTE F-UP OR LMTD: CPT

## 2020-02-10 PROCEDURE — 74011250637 HC RX REV CODE- 250/637: Performed by: INTERNAL MEDICINE

## 2020-02-10 RX ORDER — PANTOPRAZOLE SODIUM 40 MG/1
40 TABLET, DELAYED RELEASE ORAL
Status: DISCONTINUED | OUTPATIENT
Start: 2020-02-11 | End: 2020-02-12 | Stop reason: HOSPADM

## 2020-02-10 RX ORDER — LIDOCAINE 4 G/100G
2 PATCH TOPICAL EVERY 24 HOURS
Status: DISCONTINUED | OUTPATIENT
Start: 2020-02-10 | End: 2020-02-12

## 2020-02-10 RX ORDER — LEVOTHYROXINE SODIUM 100 UG/1
100 TABLET ORAL
Status: DISCONTINUED | OUTPATIENT
Start: 2020-02-11 | End: 2020-02-12 | Stop reason: HOSPADM

## 2020-02-10 RX ORDER — LOSARTAN POTASSIUM 25 MG/1
25 TABLET ORAL DAILY
Status: DISCONTINUED | OUTPATIENT
Start: 2020-02-11 | End: 2020-02-12

## 2020-02-10 RX ADMIN — Medication 10 ML: at 21:31

## 2020-02-10 RX ADMIN — CARVEDILOL 6.25 MG: 6.25 TABLET, FILM COATED ORAL at 08:56

## 2020-02-10 RX ADMIN — CARVEDILOL 6.25 MG: 6.25 TABLET, FILM COATED ORAL at 18:42

## 2020-02-10 RX ADMIN — Medication 10 ML: at 18:43

## 2020-02-10 NOTE — CONSULTS
Nephrology Progress Note  Kary Nicolas  Date of Admission : 2/8/2020    CC: Follow up for hyponatremia and hyperkalemia       Assessment and Plan     Hyponatremia and hyperkalemia:  - from volume depletion  - TSH, random cortisol ok  - cont present care  - FR 1.2L/d  - daily Na levels       Mild LAM form volume depletion, low C. O from pericardial effusion:  - resolved     Pericardial effusion a/w hypotension  -S/P pericardiocentesis 2/9/20; drain remains  - serologic w/u pending     PAF, S/P ablation and subsequent BiV pacemaker     Non ischemic CM - most recent LVEF~45-50% per Cards     H/o PMR - no h/o SLE or other atuoimmune disease     DM2:  - on insulin     HTN:  - BP stable on coreg     Hypothyroid:  - TSH ok       Interval History:  Seen and examined. Na 131, K normal.  Stable UOP. BP stable. About 130cc from pericardial drain. No cp, sob, n/v/d reported. Current Medications: all current  Medications have been eviewed in EPIC  Review of Systems: Pertinent items are noted in HPI. Objective:  Vitals:    Vitals:    02/10/20 0400 02/10/20 0500 02/10/20 0600 02/10/20 0700   BP: 114/74 107/68 111/64 111/71   Pulse: 76 75 75 75   Resp: 20 (!) 36 19 16   Temp: 98.8 °F (37.1 °C)      SpO2: 96% 96% 97% 99%   Weight: 74.9 kg (165 lb 2 oz)      Height:         Intake and Output:  No intake/output data recorded.   02/08 1901 - 02/10 0700  In: 2466.8 [I.V.:2466.8]  Out: 3821 [Urine:2525; Drains:1465]    Physical Examination:  Pt intubated     No  General: NAD,Conversant   Neck:  Supple, no mass  Resp:  Lungs CTA B/L, no wheezing , normal respiratory effort  CV:  RRR,  no murmur or rub, no LE edema  GI:  Soft, NT, + Bowel sounds, no hepatosplenomegaly  Neurologic:  Non focal  Psych:             AAO x 3 appropriate affect   Skin:  No Rash  :  No perez    []    High complexity decision making was performed  []    Patient is at high-risk of decompensation with multiple organ involvement    Lab Data Personally Reviewed: I have reviewed all the pertinent labs, microbiology data and radiology studies during assessment.     Recent Labs     02/10/20  0300 02/09/20  1524 02/09/20  0935 02/09/20  0554 02/09/20  0328 02/08/20  2248   * 131* 129*  128* 125*  --  121*   K 4.3 4.4 4.7  4.6 5.6*  --  5.8*    103 100  100 98  --  89*   CO2 24 22 22  24 17*  --  23   * 105* 175*  176* 193*  --  227*   BUN 22* 28* 31*  30* 34*  --  35*   CREA 0.62 0.85 0.89  0.88 1.07*  --  1.19*   CA 8.8 8.2* 8.6  8.5 8.4*  --  9.0   MG  --   --   --   --   --  2.5*   PHOS  --  3.2  --   --   --   --    ALB 2.6* 2.7* 2.9* 2.7*  --  3.5   SGOT 201*  --  325* 288*  --  203*   *  --  452* 429*  --  329*   INR  --   --   --   --  1.2*  --      Recent Labs     02/10/20  0300 02/09/20  0935 02/09/20  0554   WBC 9.6 9.7 10.6   HGB 9.3* 9.8* 9.6*   HCT 28.6* 30.0* 30.9*    193 165     No results found for: [facility]  Lab Results   Component Value Date/Time    Culture result: PENDING 02/09/2020 04:39 AM    Culture result: NO GROWTH 2 DAYS 02/08/2020 10:48 PM     Recent Results (from the past 24 hour(s))   METABOLIC PANEL, BASIC    Collection Time: 02/09/20  9:35 AM   Result Value Ref Range    Sodium 129 (L) 136 - 145 mmol/L    Potassium 4.7 3.5 - 5.1 mmol/L    Chloride 100 97 - 108 mmol/L    CO2 22 21 - 32 mmol/L    Anion gap 7 5 - 15 mmol/L    Glucose 175 (H) 65 - 100 mg/dL    BUN 31 (H) 6 - 20 MG/DL    Creatinine 0.89 0.55 - 1.02 MG/DL    BUN/Creatinine ratio 35 (H) 12 - 20      GFR est AA >60 >60 ml/min/1.73m2    GFR est non-AA >60 >60 ml/min/1.73m2    Calcium 8.6 8.5 - 10.1 MG/DL   SED RATE (ESR)    Collection Time: 02/09/20  9:35 AM   Result Value Ref Range    Sed rate, automated 33 (H) 0 - 30 mm/hr   C REACTIVE PROTEIN, QT    Collection Time: 02/09/20  9:35 AM   Result Value Ref Range    C-Reactive protein 3.98 (H) 0.00 - 0.60 mg/dL   HEMOGLOBIN A1C WITH EAG    Collection Time: 02/09/20  9:35 AM   Result Value Ref Range Hemoglobin A1c 6.4 (H) 4.0 - 5.6 %    Est. average glucose 137 mg/dL   CBC WITH AUTOMATED DIFF    Collection Time: 02/09/20  9:35 AM   Result Value Ref Range    WBC 9.7 3.6 - 11.0 K/uL    RBC 3.41 (L) 3.80 - 5.20 M/uL    HGB 9.8 (L) 11.5 - 16.0 g/dL    HCT 30.0 (L) 35.0 - 47.0 %    MCV 88.0 80.0 - 99.0 FL    MCH 28.7 26.0 - 34.0 PG    MCHC 32.7 30.0 - 36.5 g/dL    RDW 18.7 (H) 11.5 - 14.5 %    PLATELET 948 337 - 896 K/uL    MPV 11.0 8.9 - 12.9 FL    NRBC 0.0 0  WBC    ABSOLUTE NRBC 0.00 0.00 - 0.01 K/uL    NEUTROPHILS 81 (H) 32 - 75 %    LYMPHOCYTES 9 (L) 12 - 49 %    MONOCYTES 9 5 - 13 %    EOSINOPHILS 0 0 - 7 %    BASOPHILS 0 0 - 1 %    IMMATURE GRANULOCYTES 1 (H) 0.0 - 0.5 %    ABS. NEUTROPHILS 7.8 1.8 - 8.0 K/UL    ABS. LYMPHOCYTES 0.9 0.8 - 3.5 K/UL    ABS. MONOCYTES 0.9 0.0 - 1.0 K/UL    ABS. EOSINOPHILS 0.0 0.0 - 0.4 K/UL    ABS. BASOPHILS 0.0 0.0 - 0.1 K/UL    ABS. IMM. GRANS. 0.1 (H) 0.00 - 0.04 K/UL    DF AUTOMATED     METABOLIC PANEL, COMPREHENSIVE    Collection Time: 02/09/20  9:35 AM   Result Value Ref Range    Sodium 128 (L) 136 - 145 mmol/L    Potassium 4.6 3.5 - 5.1 mmol/L    Chloride 100 97 - 108 mmol/L    CO2 24 21 - 32 mmol/L    Anion gap 4 (L) 5 - 15 mmol/L    Glucose 176 (H) 65 - 100 mg/dL    BUN 30 (H) 6 - 20 MG/DL    Creatinine 0.88 0.55 - 1.02 MG/DL    BUN/Creatinine ratio 34 (H) 12 - 20      GFR est AA >60 >60 ml/min/1.73m2    GFR est non-AA >60 >60 ml/min/1.73m2    Calcium 8.5 8.5 - 10.1 MG/DL    Bilirubin, total 1.3 (H) 0.2 - 1.0 MG/DL    ALT (SGPT) 452 (H) 12 - 78 U/L    AST (SGOT) 325 (H) 15 - 37 U/L    Alk.  phosphatase 221 (H) 45 - 117 U/L    Protein, total 5.7 (L) 6.4 - 8.2 g/dL    Albumin 2.9 (L) 3.5 - 5.0 g/dL    Globulin 2.8 2.0 - 4.0 g/dL    A-G Ratio 1.0 (L) 1.1 - 2.2     RESPIRATORY PANEL,PCR,NASOPHARYNGEAL    Collection Time: 02/09/20  9:35 AM   Result Value Ref Range    Adenovirus NOT DETECTED NOTD      Coronavirus 229E NOT DETECTED NOTD      Coronavirus HKU1 NOT DETECTED NOTD      Coronavirus CVNL63 NOT DETECTED NOTD      Coronavirus OC43 NOT DETECTED NOTD      Metapneumovirus NOT DETECTED NOTD      Rhinovirus and Enterovirus NOT DETECTED NOTD      Influenza A NOT DETECTED NOTD      Influenza A, subtype H1 NOT DETECTED NOTD      Influenza A, subtype H3 NOT DETECTED NOTD      INFLUENZA A H1N1 PCR NOT DETECTED NOTD      Influenza B NOT DETECTED NOTD      Parainfluenza 1 NOT DETECTED NOTD      Parainfluenza 2 NOT DETECTED NOTD      Parainfluenza 3 NOT DETECTED NOTD      Parainfluenza virus 4 NOT DETECTED NOTD      RSV by PCR NOT DETECTED NOTD      B. parapertussis, PCR NOT DETECTED NOTD      Bordetella pertussis - PCR NOT DETECTED NOTD      Chlamydophila pneumoniae DNA, QL, PCR NOT DETECTED NOTD      Mycoplasma pneumoniae DNA, QL, PCR NOT DETECTED NOTD     CORTISOL    Collection Time: 02/09/20  9:56 AM   Result Value Ref Range    Cortisol, random 40.0 ug/dL   TSH 3RD GENERATION    Collection Time: 02/09/20  9:56 AM   Result Value Ref Range    TSH 1.10 0.36 - 3.74 uIU/mL   GLUCOSE, POC    Collection Time: 02/09/20 11:21 AM   Result Value Ref Range    Glucose (POC) 212 (H) 65 - 100 mg/dL    Performed by Jacqueline Miller    GLUCOSE, POC    Collection Time: 02/09/20  2:47 PM   Result Value Ref Range    Glucose (POC) 132 (H) 65 - 100 mg/dL    Performed by Jacqueline Miller    SODIUM, UR, RANDOM    Collection Time: 02/09/20  2:54 PM   Result Value Ref Range    Sodium,urine random 9 MMOL/L   CREATININE, UR, RANDOM    Collection Time: 02/09/20  2:54 PM   Result Value Ref Range    Creatinine, urine 80.80 mg/dL   RENAL FUNCTION PANEL    Collection Time: 02/09/20  3:24 PM   Result Value Ref Range    Sodium 131 (L) 136 - 145 mmol/L    Potassium 4.4 3.5 - 5.1 mmol/L    Chloride 103 97 - 108 mmol/L    CO2 22 21 - 32 mmol/L    Anion gap 6 5 - 15 mmol/L    Glucose 105 (H) 65 - 100 mg/dL    BUN 28 (H) 6 - 20 MG/DL    Creatinine 0.85 0.55 - 1.02 MG/DL    BUN/Creatinine ratio 33 (H) 12 - 20      GFR est AA >60 >60 ml/min/1.73m2    GFR est non-AA >60 >60 ml/min/1.73m2    Calcium 8.2 (L) 8.5 - 10.1 MG/DL    Phosphorus 3.2 2.6 - 4.7 MG/DL    Albumin 2.7 (L) 3.5 - 5.0 g/dL   GLUCOSE, POC    Collection Time: 02/09/20  4:17 PM   Result Value Ref Range    Glucose (POC) 120 (H) 65 - 100 mg/dL    Performed by Yoshi Kilgore    LACTIC ACID    Collection Time: 02/09/20  4:50 PM   Result Value Ref Range    Lactic acid 1.7 0.4 - 2.0 MMOL/L   GLUCOSE, POC    Collection Time: 02/09/20 10:34 PM   Result Value Ref Range    Glucose (POC) 147 (H) 65 - 100 mg/dL    Performed by Cecilio Reilly    CBC WITH AUTOMATED DIFF    Collection Time: 02/10/20  3:00 AM   Result Value Ref Range    WBC 9.6 3.6 - 11.0 K/uL    RBC 3.21 (L) 3.80 - 5.20 M/uL    HGB 9.3 (L) 11.5 - 16.0 g/dL    HCT 28.6 (L) 35.0 - 47.0 %    MCV 89.1 80.0 - 99.0 FL    MCH 29.0 26.0 - 34.0 PG    MCHC 32.5 30.0 - 36.5 g/dL    RDW 19.5 (H) 11.5 - 14.5 %    PLATELET 968 987 - 565 K/uL    MPV 11.1 8.9 - 12.9 FL    NRBC 0.0 0  WBC    ABSOLUTE NRBC 0.00 0.00 - 0.01 K/uL    NEUTROPHILS 74 32 - 75 %    LYMPHOCYTES 15 12 - 49 %    MONOCYTES 10 5 - 13 %    EOSINOPHILS 0 0 - 7 %    BASOPHILS 0 0 - 1 %    IMMATURE GRANULOCYTES 1 (H) 0.0 - 0.5 %    ABS. NEUTROPHILS 7.0 1.8 - 8.0 K/UL    ABS. LYMPHOCYTES 1.5 0.8 - 3.5 K/UL    ABS. MONOCYTES 0.9 0.0 - 1.0 K/UL    ABS. EOSINOPHILS 0.0 0.0 - 0.4 K/UL    ABS. BASOPHILS 0.0 0.0 - 0.1 K/UL    ABS. IMM.  GRANS. 0.1 (H) 0.00 - 0.04 K/UL    DF AUTOMATED     METABOLIC PANEL, COMPREHENSIVE    Collection Time: 02/10/20  3:00 AM   Result Value Ref Range    Sodium 131 (L) 136 - 145 mmol/L    Potassium 4.3 3.5 - 5.1 mmol/L    Chloride 102 97 - 108 mmol/L    CO2 24 21 - 32 mmol/L    Anion gap 5 5 - 15 mmol/L    Glucose 114 (H) 65 - 100 mg/dL    BUN 22 (H) 6 - 20 MG/DL    Creatinine 0.62 0.55 - 1.02 MG/DL    BUN/Creatinine ratio 35 (H) 12 - 20      GFR est AA >60 >60 ml/min/1.73m2    GFR est non-AA >60 >60 ml/min/1.73m2    Calcium 8.8 8.5 - 10.1 MG/DL    Bilirubin, total 0.5 0.2 - 1.0 MG/DL    ALT (SGPT) 387 (H) 12 - 78 U/L    AST (SGOT) 201 (H) 15 - 37 U/L    Alk. phosphatase 192 (H) 45 - 117 U/L    Protein, total 5.3 (L) 6.4 - 8.2 g/dL    Albumin 2.6 (L) 3.5 - 5.0 g/dL    Globulin 2.7 2.0 - 4.0 g/dL    A-G Ratio 1.0 (L) 1.1 - 2.2     GLUCOSE, POC    Collection Time: 02/10/20  6:36 AM   Result Value Ref Range    Glucose (POC) 133 (H) 65 - 100 mg/dL    Performed by Beka Khalil MD  [address] [place] [address]  Phone - [phone]   Fax - [phone]  [email or web address]

## 2020-02-10 NOTE — CONSULTS
Cardiac Electrophysiology Hospital Consultation Note     Subjective:      Juan Jose Orlando is a 78 y.o. patient who is seen for evaluation/management of Medtronic biventricular pacemaker (DOI 11/2019) in setting of pericardial effusion. She was admitted on 02/08/2020 with 2 week history of progressive lethargy, weakness, poor appetite, n/v, & febrile sensation. Near syncope 5-7 days ago, managed with IV hydration. Over past 3-4 days, had increasing tightness in upper chest with pain on deep inspiration. Hypotensive with lactic acid 6.4 on date of admission. Troponin negative. Mildly anemic. Hyponatremic & hyperkalemic. Blood cultures negative to date. CXR showed increased cardiac silhouette. Chest CT showed large pericardial effusion with small RV dimensions. Bedside echo also showed large pericardial effusion with partial RV collapse. She underwent emergent pericardiocentesis on 02/09/2020, had 1100 ml bloody fluid removed. Drain remains in place; scant drainage appears sanguinous. No growth on to date on culture. She reports feeling much better today, eager to get out of bed to sit. Biventricular pacing with underlying AF, stable BP. Xarelto held during current admission. Echo (02/09/2020): LVEF 50-55%, mild concentric LVH. Mod dilated LA. Severely dilated RA. Trace MR. Trace AR. Trace TR. Mod pericardial effusion without indication of tamponade. Compared to pre pericardiocentesis, fluid significantly reduced & now not circumferential but limited to posterior of LV. Few strands of fibrin noted, loculation may not be completely excluded. Previous:  Seen in ER 02/05/2020 with chest pain (worse with deep inspiration), neck pain, & sore throat x 4 days. Pacemaker check showed proper function without significant arrhythmic episodes.     S/p AV node ablation 12/23/2019 at outside hospital.    Medtronic biventricular pacemaker in 11/2019 by Dr. Hernan iCfuentes at [facility].    Echo (11/09/2018): LVEF 55-65%, no RWMA. Reduced RV systolic function. Mod INDER. Mod MR. Mild AR. Mild TR. Mother-in-law of Dr. Kamari Yoder.       Problem List  Date Reviewed: 1/10/2019          Codes Class Noted    Pericardial effusion ICD-10-CM: I31.3  ICD-9-CM: 423.9  2/9/2020        A-fib ([facility] ICD-10-CM: I48.91  ICD-9-CM: 427.31  Unknown        Acquired hypothyroidism ICD-10-CM: E03.9  ICD-9-CM: 244.9  7/12/2017        Controlled type 2 diabetes mellitus without complication ([facility] ZIQ-11-XD: E11.9  ICD-9-CM: 250.00  7/12/2017        Neck pain on right side ICD-10-CM: M54.2  ICD-9-CM: 723.1  1/3/2014        Polymyalgia rheumatica ([facility] ICD-10-CM: M35.3  ICD-9-CM: 427  1/3/2014        Thyroid nodule ICD-10-CM: E04.1  ICD-9-CM: 241.0  1/3/2014        Hypertension ICD-10-CM: I10  ICD-9-CM: 401.9  8/14/2009    Overview Signed 7/12/2017  8:40 AM by Nikolay MAXWELL     Overview:                    Current Facility-Administered Medications   Medication Dose Route Frequency Provider Last Rate Last Dose    sodium chloride (NS) flush 5-40 mL  5-40 mL IntraVENous Q8H Bruce Joseph NP   10 mL at 02/09/20 2239    sodium chloride (NS) flush 5-40 mL  5-40 mL IntraVENous PRN Bruce Joseph, NP        acetaminophen (TYLENOL) tablet 650 mg  650 mg Oral Q4H PRN Bruce Joseph NP        ondansetron (ZOFRAN) injection 4 mg  4 mg IntraVENous Q4H PRN Bruce Joseph, NP        magnesium hydroxide (MILK OF MAGNESIA) 400 mg/5 mL oral suspension 30 mL  30 mL Oral DAILY PRN Bruce Joseph NP        influenza vaccine 2019-20 (6 mos+)(PF) (FLUARIX/FLULAVAL/FLUZONE QUAD) injection 0.5 mL  0.5 mL IntraMUSCular PRIOR TO DISCHARGE Aury Mann MD        carvediloL (COREG) tablet 6.25 mg  6.25 mg Oral BID WITH MEALS Mamie Crespo MD   6.25 mg at 02/10/20 0856    glucose chewable tablet 16 g  4 Tab Oral PRN Mamie Crespo MD        glucagon (GLUCAGEN) injection 1 mg  1 mg IntraMUSCular PRN Kaleigh Oliva MD        dextrose 10% infusion 0-250 mL  0-250 mL IntraVENous PRN Vonda Barrera, Nuria Squires MD        insulin lispro (HUMALOG) injection   SubCUTAneous TIDAC Kaleigh Oliva MD   Stopped at 20 1630    lidocaine (LIDODERM) 5 % patch 2 Patch  2 Patch TransDERmal Q24H Tamia Patel MD   2 Patch at 20 2240    sodium chloride (NS) flush 5-10 mL  5-10 mL IntraVENous PRN Tamia Patel MD         Allergies   Allergen Reactions    Ciprofloxacin Myalgia    Quinolones Other (comments)     EYES CAN'T FOCUS, PATIENT CANT WALK      Past Medical History:   Diagnosis Date    A-fib ([facility]     Arthritis     OA    Diabetes ([facility]     PRE DIABETIC    GERD (gastroesophageal reflux disease)     Hypertension     Ill-defined condition     MACULAR DEGENERATION    Polymyalgia ([facility]     Thyroid disease      Past Surgical History:   Procedure Laterality Date    HX APPENDECTOMY      HX BLEPHAROPLASTY      HX CATARACT REMOVAL Bilateral     HX DILATION AND CURETTAGE      HX ORTHOPAEDIC      RIGHT AND LEFT ARTHROSCOPIC SURGERY    HX ORTHOPAEDIC      RIGHT AND LEFT KNEE PARTIAL REPLACEMENTS JOINTS     Family History   Problem Relation Age of Onset    Stroke Mother     Diabetes Mother     Migraines Mother     Cancer Father         PROSTATE CANCER AND COLON CANCER    Liver Disease Father         HAD BLOOD TRANSFUSION,  OF CIRRHOSIS OF LIVER, WAS NON DRINKER    Diabetes Brother     Anesth Problems Neg Hx      Social History     Tobacco Use    Smoking status: Former Smoker    Smokeless tobacco: Never Used   Substance Use Topics    Alcohol use: Yes     Alcohol/week: 1.0 standard drinks     Types: 1 Glasses of wine per week     Comment: RARE        Review of Systems:   Constitutional: + recent subjective fever, no chills, no weight loss, + recent malaise/fatigue. HEENT: Negative for nosebleeds, vision changes.    Respiratory: Negative for cough, hemoptysis  Cardiovascular: + chest pain, no palpitations, orthopnea, claudication, leg swelling, syncope, and PND. + recent SOB  Gastrointestinal: Negative for nausea, vomiting, + recent diarrhea, no blood in stool and melena. Genitourinary: Negative for dysuria, and hematuria. + recent UTI  Musculoskeletal: Negative for myalgias, arthralgia. Skin: Negative for rash. Heme: Does not bleed or bruise easily. Neurological: Negative for speech change and focal weakness     Objective:     Visit Vitals  /67   Pulse 77   Temp 98.7 °F (37.1 °C)   Resp 24   Ht 5' 5\" (1.651 m)   Wt 165 lb 2 oz (74.9 kg)   SpO2 (!) 88%   BMI 27.48 kg/m²      Physical Exam:   Constitutional: Well-developed and well-nourished. No respiratory distress. Head: Normocephalic and atraumatic. Eyes: Pupils are equal, round  ENT: Hearing normal  Neck: Supple. No JVD present. Cardiovascular: Normal rate, regular rhythm. Exam reveals no gallop and no friction rub. No murmur heard. Pulmonary/Chest: Effort normal and diminished in bases bilaterally. No wheezes. Abdominal: Soft, no tenderness. Musculoskeletal: Trace bilat lower extremity edema. Neurological: Alert,oriented. Skin: Skin is warm and dry. Biventricular pacemaker site prominent, but well healed. Psychiatric: Normal mood and affect. Behavior is normal. Judgment and thought content normal.      EKG (02/09/2020): Biventricular paced rhythm. Assessment/Plan:     Ms. Colleen Flores presented with circumferential pericardial effusion & signs of tamponade on 02/09/2020. Now s/p emergent pericardiocentesis, had 1100 ml sanguinous fluid drained. Post procedure echo showed improvement in size, now only posterior to LV. Drain remains in place, scant sanguinous drainage. Cultured fluid without growth to date. Pacemaker interrogation showed proper lead & generator function, no indication of lead dislodgement. Source of bleeding is unclear.   At this point, ablation is more likely cause than pacemaker lead, as it occurred later & device check is ok. However, ablation was 6 weeks ago, would've expected earlier presentation. Anticipate pulling drain when output is <25 ml in 24 hrs. Obtain echo & chest CT thereafter. LVEF 50-55% via post procedure echo, history NICM. Baseline is NYHA II, unable to assess current status as she is confined to bed & chair. No GDMT PTA, only diltiazem & amlodipine. These have not been given during current admission, but started carvedilol 6.25 mg po bid. She remains hyponatremic, but improved. Continue to hold Xarelto for now. TERRANCE [address] [facility]  02/10/20    Addendum from EP attending:   I have seen, examined patient, and discussed with nurse practitioner, registered nurse, reviewed, updated note and agree with the assessment and plan    I have talked to her this pm. She is feeling better and no pain now  She had pain in chest before admission   She had intermittent dizziness and shortness of breaths not feeling well post AV node ablation   Vital signs with stable BP but still on low side  Exam shows regular rhythm   Chest wall without swelling in pacer pocket. There is no redness  Assessment and Plan:  I checked her biventricular pacemaker and it showed she is in atrial flutter and dependent on BIV pacing so no R wave sensing  All pacing impedances of 3 leads are normal   Pacing threshold of RV and LV leads are 1 V @ 0.4 ms  I reviewed chest CT 2/9/2020 and RV lead appears apically toward free wall. Will recheck the RV lead in different sitting position tomorrow  Pericardial drain is not putting out much this afternoon  I discussed with Dr Campos  and if echo shows minimal effusion, we can remove the drain  I am still not sure why she had effusion.   Perforation is delayed for both BIV pacer and AV node ablation     Thank you for involving me in this patient's care and please call with further concerns or questions. Manuel Pete M.D.   Electrophysiology/Cardiology  [facility]  [address] [facility], [facility] [place] [address] [place] [address]  [phone]

## 2020-02-10 NOTE — PROGRESS NOTES
[facility] Adult  Hospitalist Group     ICU Transfer/Accept Summary     This patient is being transferred [place] [address] ICU  1. DATE OF TRANSFER: 2/10/2020 Cardiac tamponade - 2/9 pericardiocentesis with ~1.1L serosanguinous drainage. Cardiology following. Suspect either slow leak post pacer vs viral infection. Will send ESR and CRP. Continue to monitor drain output. Send respiratory panel  2. HTN - Off Cardene drip   3. Hyperkalemia and Hyponatremia - Likely volume overload. Will attempt diuresis. Renal consulted. 4. DM- ISS and hypoglycemic protocol  5. Atrial Fibrillation - Rate controlled. Post pacer. Previously on eliquis. On hold due to effusion. Defer to cardiology. Non-ischemic cardiomyopathy       PATIENT ID: Patrick Blood  MRN: [id number]   YOB: 1940    PRIMARY CARE PROVIDER: Griselda Dance, MD   DATE OF ADMISSION: 2/8/2020 10:28 PM    ATTENDING PHYSICIAN: Doris Mason MD  CONSULTATIONS:   IP CONSULT TO INTENSIVIST  IP CONSULT TO NEPHROLOGY  IP CONSULT TO CARDIOLOGY    PROCEDURES/SURGERIES:   Procedure(s):  Pericardiocentesis    REASON FOR ADMISSION: <principal problem not specified>     HOSPITAL PROBLEM LIST:  Patient Active Problem List   Diagnosis Code    Hypertension I10    Neck pain on right side M54.2    Polymyalgia rheumatica (HCC) M35.3    Thyroid nodule E04.1    A-fib ([address] I48.91    Acquired hypothyroidism E03.9    Controlled type 2 diabetes mellitus without complication (HCC) I21.9    Pericardial effusion I31.3         Brief HPI and Hospital Course:        Assessment and Plan:    She was admitted on 02/08/2020 with 2 week history of progressive lethargy, weakness, poor appetite, n/v, & febrile sensation. Near syncope 5-7 days ago, managed with IV hydration. Over past 3-4 days, had increasing tightness in upper chest with pain on deep inspiration.       Hypotensive with lactic acid 6.4 on date of admission. Troponin negative. Mildly anemic.   Hyponatremic & hyperkalemic. Blood cultures negative to date.     CXR showed increased cardiac silhouette. Chest CT showed large pericardial effusion with small RV dimensions. Bedside echo also showed large pericardial effusion with partial RV collapse.     She underwent emergent pericardiocentesis on 02/09/2020, had 1100 ml bloody fluid removed. Drain remains in place; scant drainage appears sanguinous. No growth on to date on culture.     She reports feeling much better today, eager to get out of bed to sit. Biventricular pacing with underlying AF, stable BP.     Xarelto held during current admission. Hyponatremia and hyperkalemia:  - from volume depletion                         PHYSICAL EXAMINATION:  Visit Vitals  /69   Pulse 76   Temp 98.6 °F (37 °C)   Resp 24   Ht 5' 5\" (1.651 m)   Wt 74 kg (163 lb 2.3 oz)   SpO2 98%   BMI 27.15 kg/m²       General:          Alert, cooperative, no distress  HEENT:           Atraumatic, MMM            Neck:               Supple, symmetrical,  thyroid: non tender  Lungs:             Clear to auscultation bilaterally. No Wheezing or Rhonchi. No rales. Heart:              Regular  rhythm,  No  murmur   No edema  Abdomen:       Soft, non-tender. Not distended. Bowel sounds normal  Extremities:     No cyanosis. No clubbing,  +2 distal pulses  Skin:                Not pale. Not Jaundiced  No rashes   Psych:             Not anxious or agitated.   Neurologic:      Alert, moves all extremities, oriented X 3.     CODE STATUS:   Full Code    DNR    Partial    Comfort Care     Signed:   Ev Reynolds MD  Date of Service:  2/10/2020  5:41 PM

## 2020-02-10 NOTE — PROGRESS NOTES
[facility]    ICU TEAM Progress Note    Name: Enrrique Kraft   : 1940   MRN: [id number]   Date: 2/10/2020      Assessment/Plan:     ICU Problems:    1. Cardiac tamponade -  pericardiocentesis with ~1.1L serosanguinous drainage. Cardiology following. Suspect either slow leak post pacer vs viral infection. Note elevated CRP/ESR suggestive of inflammatory process. Continue to monitor drain output. Negative respiratory panel  2. HTN - Continue PTA Coreg  3. Hyperkalemia and Hyponatremia - Renal consulted - will defer. Improved/stable  4. DM- ISS and hypoglycemic protocol  5. Atrial Fibrillation - Rate controlled. Post pacer. Previously on eliquis. On hold due to effusion. Defer to cardiology for reinitiation of AC. 6. Non-ischemic cardiomyopathy - not in exacerbation. Subjective:   Progress Note: 2/10/2020      Reason for ICU Admission: Pericardial Effusion     Overnight Events: Patient with a-fib, thyroid disease, NICM, recent BiV pacer placement (Dec 2019), and DM who presented with generalized malaise and shock. Found with large pericardial effusion leading to tamponade physiology. She is now s/p pericardiocentesis with improvement on symptoms. No adverse events overnight.      POD:1 Day of Surgery    S/P: Procedure(s):  Pericardiocentesis    Active Problem List:     Problem List  Date Reviewed: 1/10/2019          Codes Class    Pericardial effusion ICD-10-CM: I31.3  ICD-9-CM: 423.9         A-fib ([email or web address] ICD-10-CM: I48.91  ICD-9-CM: 427.31         Acquired hypothyroidism ICD-10-CM: E03.9  ICD-9-CM: 244.9         Controlled type 2 diabetes mellitus without complication ([facility] [[email or web address] ETT-71-XB: E11.9  ICD-9-CM: 250.00         Neck pain on right side ICD-10-CM: M54.2  ICD-9-CM: 723.1         Polymyalgia rheumatica ([facility] [[email or web address] ICD-10-CM: M35.3  ICD-9-CM: 219         Thyroid nodule ICD-10-CM: E04.1  ICD-9-CM: 241.0         Hypertension ICD-10-CM: I10  ICD-9-CM: 401.9     Overview Signed 2017  8:40 AM by Micaela MAXWELL     Overview:                    Past Medical History:      has a past medical history of A-fib ([facility] Arthritis, Diabetes ([facility] GERD (gastroesophageal reflux disease), Hypertension, Ill-defined condition, Polymyalgia ([facility] and Thyroid disease. Past Surgical History:      has a past surgical history that includes hx appendectomy; hx dilation and curettage; hx cataract removal (Bilateral); hx blepharoplasty; hx orthopaedic; and hx orthopaedic. Home Medications:     Prior to Admission medications    Medication Sig Start Date End Date Taking? Authorizing Provider   pantoprazole (PROTONIX) 20 mg tablet  1/5/19  Yes Provider, Historical   digestive enzymes cap Take  by mouth. Yes Provider, Historical   mecobalamin, vitamin B12, 1,000 mcg TbDi Place under the tongue. Yes Provider, Historical   levothyroxine (SYNTHROID) 100 mcg tablet Take 100 mcg by mouth Daily (before breakfast). Yes Provider, Historical   VIT C/E/ZN/COPPR/LUTEIN/ZEAXAN (PRESERVISION AREDS 2 PO) Take  by mouth. Yes Provider, Historical   cholecalciferol (VITAMIN D3) 1,000 unit cap Take 1,000 Units by mouth daily. Yes Provider, Historical   glucosamine (GLUCOSAMINE RELIEF) 1,000 mg tab Take  by mouth. WITH CHONDROINTIN 1500MG DAILY   Yes Provider, Historical   XARELTO 20 mg tab tablet TAKE ONE TABLET BY MOUTH DAILY 4/5/19   Maria E Liu MD   amLODIPine (NORVASC) 2.5 mg tablet Take 2.5 mg by mouth. Provider, Historical   magnesium 250 mg tab Take  by mouth. Provider, Historical   dietary supplement cap Take  by mouth. Provider, Historical   CALCIUM-MAGNESIUM PO Take 600 mg by mouth. Provider, Historical   dilTIAZem CD (CARTIA XT) 120 mg ER capsule Take  by mouth daily. Provider, Historical       Allergies/Social/Family History:      Allergies   Allergen Reactions    Ciprofloxacin Myalgia    Quinolones Other (comments)     EYES CAN'T FOCUS, PATIENT CANT WALK       Social History     Tobacco Use    Smoking status: Former Smoker    Smokeless tobacco: Never Used   Substance Use Topics    Alcohol use: Yes     Alcohol/week: 1.0 standard drinks     Types: 1 Glasses of wine per week     Comment: RARE      Family History   Problem Relation Age of Onset    Stroke Mother     Diabetes Mother     Migraines Mother     Cancer Father         PROSTATE CANCER AND COLON CANCER    Liver Disease Father         HAD BLOOD TRANSFUSION,  OF CIRRHOSIS OF LIVER, WAS NON DRINKER    Diabetes Brother     Anesth Problems Neg Hx        Review of Systems:     Pertinent items are noted in HPI. Objective:   Vital Signs:  Visit Vitals  /71   Pulse 75   Temp 98.8 °F (37.1 °C)   Resp 16   Ht 5' 5\" (1.651 m)   Wt 74.9 kg (165 lb 2 oz)   SpO2 99%   BMI 27.48 kg/m²    O2 Flow Rate (L/min): 2 l/min O2 Device: Room air Temp (24hrs), Av.3 °F (37.4 °C), Min:98.8 °F (37.1 °C), Max:99.9 °F (37.7 °C)           Intake/Output:     Intake/Output Summary (Last 24 hours) at 2/10/2020 0848  Last data filed at 2/10/2020 0700  Gross per 24 hour   Intake 52.08 ml   Output 1860 ml   Net -1807.92 ml       Physical Exam:    General: NAD  HENT: Atraumatic  Cardio: RRR  Respiratory: CTA x 2  GI: Soft not tender abdomen  Extremities: +ve edema  Neuro: Grossly intact      LABS AND  DATA: Personally reviewed  Recent Labs     02/10/20  0300 20  0935   WBC 9.6 9.7   HGB 9.3* 9.8*   HCT 28.6* 30.0*    193     Recent Labs     02/10/20  0300 20  1524  20  2248   * 131*   < > 121*   K 4.3 4.4   < > 5.8*    103   < > 89*   CO2 24 22   < > 23   BUN 22* 28*   < > 35*   CREA 0.62 0.85   < > 1.19*   * 105*   < > 227*   CA 8.8 8.2*   < > 9.0   MG  --   --   --  2.5*   PHOS  --  3.2  --   --     < > = values in this interval not displayed.      Recent Labs     02/10/20  0300 20  1524 20  0935   SGOT 201*  --  325*   *  --  221*   TP 5.3*  --  5.7*   ALB 2.6* 2.7* 2.9*   GLOB 2.7  --  2.8     Recent Labs     02/09/20  0328   INR 1.2*   PTP 12.2*   APTT 26.2      No results for input(s): PHI, PCO2I, PO2I, FIO2I in the last 72 hours. Recent Labs     02/08/20  2248   TROIQ <0.05       Hemodynamics:   PAP:   CO:     Wedge:   CI:     CVP:    SVR:       PVR:       Ventilator Settings:  Mode Rate Tidal Volume Pressure FiO2 PEEP                    Peak airway pressure:      Minute ventilation:          MEDS: Reviewed    Chest X-Ray:  CXR Results  (Last 48 hours)               02/08/20 2318  XR CHEST PORT Final result    Impression:  IMPRESSION: No Acute Disease. Narrative:  EXAM: Portable CXR. 2302 hours         INDICATION: Chest Pain       FINDINGS:   The lungs are clear. Heart is large. Pacemaker is present. There is no pulmonary   edema. There is no evident pneumothorax, adenopathy or pleural effusion. Multidisciplinary Rounds Completed:  No    ABCDEF Bundle/Checklist  Pain Medications: Lidoderm Patches  Target RASS: N/A  Sedation Medications: N/A  CAM-ICU:  Negative  Mobility: Good     Restraints: None needed at this time  Discussed Plan of Care (goals of care):  Yes  Addressed Code Status: Partial Code    CARDIOVASCULAR  Cardiac Gtts: None  SBP Goal of: > 90 mmHg and < 160 mmHg  MAP Goal of: < 90 mmHg  Transfusion Trigger (Hgb): <7 g/dL    RESPIRATORY  Vent Goals:   N/A  DVT Prophylaxis (if no, list reason): SCD's or Sequential Compression Device   SPO2 Goal: > 92%  Pulmonary toilet: Incentive Spirometry     GI/  Craig Catheter Present: Yes  GI Prophylaxis: Not at this time   Nutrition: Yes   Bowel Movement: No  Bowel Regimen: None needed at this time  Insulin: ISS    ANTIBIOTICS  Antibiotics:  None    T/L/D  Tubes: None  Lines: Peripheral IV  Drains: pericardial drain    SPECIAL EQUIPMENT  None    DISPOSITION  Stay in ICU    Level 3    Claudia Mcclendon MD  Staff [facility]  2/10/2020

## 2020-02-10 NOTE — PROGRESS NOTES
1930 Bedside and Verbal shift change report given to Cat RN (oncoming nurse) by Sánchez Garvin (offgoing nurse). Report included the following information SBAR.     2100 Pt refused CHG bath and bed change. 0300 Labs drawn    0730 Bedside and Verbal shift change report given to Odin (oncoming nurse) by Cat RN (offgoing nurse). Report included the following information SBAR.

## 2020-02-10 NOTE — PROGRESS NOTES
Bedside shift change report given to Vilma/ Chani Bettencourt RN (oncoming nurse) by Sayra Friend RN (offgoing nurse). Report included the following information SBAR, Kardex, ED Summary, Intake/Output, MAR and Recent Results. SHIFT SUMMARY:    0800 Initial Shift  Assessment performed           Mental Status: Oriented x4           Respiratory: RA           Cardiac: Paced           GI/: Continent/ Craig removed 0730             0900 Pt sitting up eating breakfast at this time. No complaints. 1100 Daughter at bedside. Updated on plan of care. 1430 Pt assisted commode in room with 1 assist and was able to urinate without difficulty. 200 [facility] at bedside for echo.  1600 Reassessment performed. No new de paz  1700 Dr. Gabriella Smith at bedside to pull pericardial drain. 1715 Pt downgraded to stepdown. Bedside shift change report given to Sayra Friend RN (oncoming nurse) by Peter Aguilar RN (offgoing nurse). Report included the following information SBAR, Kardex, ED Summary, Intake/Output, MAR and Recent Results.

## 2020-02-10 NOTE — PROGRESS NOTES
Problem: Pain - Acute  Goal: *Control of acute pain  Outcome: Progressing Towards Goal     Problem: Patient Education: Go to Patient Education Activity  Goal: Patient/Family Education  Outcome: Progressing Towards Goal     Problem: Patient Education: Go to Patient Education Activity  Goal: Patient/Family Education  Outcome: Progressing Towards Goal     Problem: Heart Failure: Day 1  Goal: Off Pathway (Use only if patient is Off Pathway)  Outcome: Progressing Towards Goal  Goal: Activity/Safety  Outcome: Progressing Towards Goal  Goal: Consults, if ordered  Outcome: Progressing Towards Goal  Goal: Diagnostic Test/Procedures  Outcome: Progressing Towards Goal  Goal: Nutrition/Diet  Outcome: Progressing Towards Goal  Goal: Discharge Planning  Outcome: Progressing Towards Goal  Goal: Medications  Outcome: Progressing Towards Goal  Goal: Respiratory  Outcome: Progressing Towards Goal  Goal: Treatments/Interventions/Procedures  Outcome: Progressing Towards Goal  Goal: Psychosocial  Outcome: Progressing Towards Goal  Goal: *Oxygen saturation within defined limits  Outcome: Progressing Towards Goal  Goal: *Hemodynamically stable  Outcome: Progressing Towards Goal  Goal: *Optimal pain control at patient's stated goal  Outcome: Progressing Towards Goal  Goal: *Anxiety reduced or absent  Outcome: Progressing Towards Goal

## 2020-02-11 LAB
ALBUMIN SERPL-MCNC: 2.5 G/DL (ref 3.5–5)
ALBUMIN/GLOB SERPL: 0.8 {RATIO} (ref 1.1–2.2)
ALP SERPL-CCNC: 177 U/L (ref 45–117)
ALT SERPL-CCNC: 285 U/L (ref 12–78)
ANION GAP SERPL CALC-SCNC: 4 MMOL/L (ref 5–15)
AST SERPL-CCNC: 94 U/L (ref 15–37)
BASOPHILS # BLD: 0.1 K/UL (ref 0–0.1)
BASOPHILS NFR BLD: 1 % (ref 0–1)
BILIRUB SERPL-MCNC: 0.4 MG/DL (ref 0.2–1)
BUN SERPL-MCNC: 13 MG/DL (ref 6–20)
BUN/CREAT SERPL: 26 (ref 12–20)
CALCIUM SERPL-MCNC: 8.9 MG/DL (ref 8.5–10.1)
CHLORIDE SERPL-SCNC: 104 MMOL/L (ref 97–108)
CO2 SERPL-SCNC: 26 MMOL/L (ref 21–32)
CREAT SERPL-MCNC: 0.5 MG/DL (ref 0.55–1.02)
DIFFERENTIAL METHOD BLD: ABNORMAL
EOSINOPHIL # BLD: 0.1 K/UL (ref 0–0.4)
EOSINOPHIL NFR BLD: 1 % (ref 0–7)
ERYTHROCYTE [DISTWIDTH] IN BLOOD BY AUTOMATED COUNT: 19.4 % (ref 11.5–14.5)
GLOBULIN SER CALC-MCNC: 3 G/DL (ref 2–4)
GLUCOSE BLD STRIP.AUTO-MCNC: 121 MG/DL (ref 65–100)
GLUCOSE BLD STRIP.AUTO-MCNC: 143 MG/DL (ref 65–100)
GLUCOSE BLD STRIP.AUTO-MCNC: 145 MG/DL (ref 65–100)
GLUCOSE BLD STRIP.AUTO-MCNC: 170 MG/DL (ref 65–100)
GLUCOSE SERPL-MCNC: 122 MG/DL (ref 65–100)
HCT VFR BLD AUTO: 29 % (ref 35–47)
HGB BLD-MCNC: 9.4 G/DL (ref 11.5–16)
IMM GRANULOCYTES # BLD AUTO: 0.1 K/UL (ref 0–0.04)
IMM GRANULOCYTES NFR BLD AUTO: 1 % (ref 0–0.5)
LYMPHOCYTES # BLD: 1.8 K/UL (ref 0.8–3.5)
LYMPHOCYTES NFR BLD: 21 % (ref 12–49)
MCH RBC QN AUTO: 28.9 PG (ref 26–34)
MCHC RBC AUTO-ENTMCNC: 32.4 G/DL (ref 30–36.5)
MCV RBC AUTO: 89.2 FL (ref 80–99)
MONOCYTES # BLD: 0.9 K/UL (ref 0–1)
MONOCYTES NFR BLD: 11 % (ref 5–13)
NEUTS SEG # BLD: 5.5 K/UL (ref 1.8–8)
NEUTS SEG NFR BLD: 65 % (ref 32–75)
NRBC # BLD: 0 K/UL (ref 0–0.01)
NRBC BLD-RTO: 0 PER 100 WBC
PLATELET # BLD AUTO: 182 K/UL (ref 150–400)
PMV BLD AUTO: 9.8 FL (ref 8.9–12.9)
POTASSIUM SERPL-SCNC: 4.7 MMOL/L (ref 3.5–5.1)
PROT SERPL-MCNC: 5.5 G/DL (ref 6.4–8.2)
RBC # BLD AUTO: 3.25 M/UL (ref 3.8–5.2)
SERVICE CMNT-IMP: ABNORMAL
SODIUM SERPL-SCNC: 134 MMOL/L (ref 136–145)
WBC # BLD AUTO: 8.5 K/UL (ref 3.6–11)

## 2020-02-11 PROCEDURE — 74011250637 HC RX REV CODE- 250/637: Performed by: HOSPITALIST

## 2020-02-11 PROCEDURE — 74011250637 HC RX REV CODE- 250/637: Performed by: INTERNAL MEDICINE

## 2020-02-11 PROCEDURE — 82962 GLUCOSE BLOOD TEST: CPT

## 2020-02-11 PROCEDURE — 77030027138 HC INCENT SPIROMETER -A

## 2020-02-11 PROCEDURE — 85025 COMPLETE CBC W/AUTO DIFF WBC: CPT

## 2020-02-11 PROCEDURE — 36415 COLL VENOUS BLD VENIPUNCTURE: CPT

## 2020-02-11 PROCEDURE — 65660000000 HC RM CCU STEPDOWN

## 2020-02-11 PROCEDURE — 80053 COMPREHEN METABOLIC PANEL: CPT

## 2020-02-11 RX ORDER — IBUPROFEN 400 MG/1
400 TABLET ORAL 3 TIMES DAILY
Status: DISCONTINUED | OUTPATIENT
Start: 2020-02-11 | End: 2020-02-12 | Stop reason: HOSPADM

## 2020-02-11 RX ADMIN — CARVEDILOL 6.25 MG: 6.25 TABLET, FILM COATED ORAL at 16:50

## 2020-02-11 RX ADMIN — Medication 10 ML: at 21:55

## 2020-02-11 RX ADMIN — PANTOPRAZOLE SODIUM 40 MG: 40 TABLET, DELAYED RELEASE ORAL at 05:41

## 2020-02-11 RX ADMIN — IBUPROFEN 400 MG: 400 TABLET, FILM COATED ORAL at 12:59

## 2020-02-11 RX ADMIN — CARVEDILOL 6.25 MG: 6.25 TABLET, FILM COATED ORAL at 08:47

## 2020-02-11 RX ADMIN — IBUPROFEN 400 MG: 400 TABLET, FILM COATED ORAL at 16:50

## 2020-02-11 RX ADMIN — LEVOTHYROXINE SODIUM 100 MCG: 100 TABLET ORAL at 05:40

## 2020-02-11 RX ADMIN — Medication 10 ML: at 05:30

## 2020-02-11 RX ADMIN — Medication 10 ML: at 13:00

## 2020-02-11 RX ADMIN — IBUPROFEN 400 MG: 400 TABLET, FILM COATED ORAL at 21:55

## 2020-02-11 NOTE — PROGRESS NOTES
Nephrology Progress Note  Ole Pineda  Date of Admission : 2/8/2020    CC: Follow up for hyponatremia and hyperkalemia       Assessment and Plan     Hyponatremia and hyperkalemia:  - from volume depletion  - TSH, random cortisol ok  - cont present care  - daily Na levels while here       Mild LAM form volume depletion, low C. O from pericardial effusion:  - resolved     Pericardial effusion a/w hypotension  -S/P pericardiocentesis 2/9/20; drain remains  - serologic w/u pending     PAF, S/P ablation and subsequent BiV pacemaker     Non ischemic CM - most recent LVEF~45-50% per Cards     H/o PMR - no h/o SLE or other atuoimmune disease     DM2:  - on insulin     HTN:  - BP stable on coreg     Hypothyroid:  - TSH ok       Interval History:  Seen and examined. Na up to 134, K normalized. Stable UOP. BP stable. Pericardial drain out. No cp, sob, n/v/d reported. Current Medications: all current  Medications have been eviewed in EPIC  Review of Systems: Pertinent items are noted in HPI. Objective:  Vitals:    Vitals:    02/11/20 0500 02/11/20 0600 02/11/20 0700 02/11/20 0800   BP: 129/88 123/76 134/73    Pulse: 77 75 78    Resp: 20 19 18    Temp:    97.8 °F (36.6 °C)   SpO2: 100% 99% 100%    Weight:       Height:         Intake and Output:  No intake/output data recorded.   02/09 1901 - 02/11 0700  In: -   Out: 2417 [Urine:2270; Drains:147]    Physical Examination:  Pt intubated     No  General: NAD,Conversant   Neck:  Supple, no mass  Resp:  Lungs CTA B/L, no wheezing , normal respiratory effort  CV:  RRR,  no murmur or rub, no LE edema  GI:  Soft, NT, + Bowel sounds, no hepatosplenomegaly  Neurologic:  Non focal  Psych:             AAO x 3 appropriate affect   Skin:  No Rash  :  No perez    []    High complexity decision making was performed  []    Patient is at high-risk of decompensation with multiple organ involvement    Lab Data Personally Reviewed: I have reviewed all the pertinent labs, microbiology data and radiology studies during assessment. Recent Labs     02/11/20  0205 02/10/20  0300 02/09/20  1524 02/09/20  0935  02/09/20  0328 02/08/20  2248   * 131* 131* 129*  128*   < >  --  121*   K 4.7 4.3 4.4 4.7  4.6   < >  --  5.8*    102 103 100  100   < >  --  89*   CO2 26 24 22 22  24   < >  --  23   * 114* 105* 175*  176*   < >  --  227*   BUN 13 22* 28* 31*  30*   < >  --  35*   CREA 0.50* 0.62 0.85 0.89  0.88   < >  --  1.19*   CA 8.9 8.8 8.2* 8.6  8.5   < >  --  9.0   MG  --   --   --   --   --   --  2.5*   PHOS  --   --  3.2  --   --   --   --    ALB 2.5* 2.6* 2.7* 2.9*   < >  --  3.5   SGOT 94* 201*  --  325*   < >  --  203*   * 387*  --  452*   < >  --  329*   INR  --   --   --   --   --  1.2*  --     < > = values in this interval not displayed.      Recent Labs     02/11/20  0205 02/10/20  0300 02/09/20  0935   WBC 8.5 9.6 9.7   HGB 9.4* 9.3* 9.8*   HCT 29.0* 28.6* 30.0*    183 193     No results found for: SDES  Lab Results   Component Value Date/Time    Culture result: Culture performed on Unspun Fluid 02/09/2020 04:39 AM    Culture result: NO GROWTH 1 DAY 02/09/2020 04:39 AM    Culture result: No growth (<1,000 CFU/ML) 02/09/2020 12:49 AM     Recent Results (from the past 24 hour(s))   GLUCOSE, POC    Collection Time: 02/10/20 11:28 AM   Result Value Ref Range    Glucose (POC) 145 (H) 65 - 100 mg/dL    Performed by [place] Few    ECHO ADULT FOLLOW-UP OR LIMITED    Collection Time: 02/10/20  3:25 PM   Result Value Ref Range    Ao Root D 3.05 cm    LVIDd 4.44 3.9 - 5.3 cm    LVPWd 0.94 (A) 0.6 - 0.9 cm    LVIDs 3.20 cm    IVSd 0.98 (A) 0.6 - 0.9 cm    LV ED Vol A2C 49.7 mL    LV ES Vol A4C 40.4 mL    LV ES Vol BP 31.2 19 - 49 mL    Left Atrium to Aortic Root Ratio 1.18     RVIDd 2.59 cm    BP EF 53.2 (A) 55 - 100 %    LV Ejection Fraction MOD 4C 47 %    LV Ejection Fraction MOD 2C 53 %    LV Mass .8 (A) 67 - 162 g    LV Mass AL Index 89.7 43 - 95 g/m2    LV ES Vol A2C 23.5 mL    LVES Vol Index BP 17.2 mL/m2    LV ED Vol A4C 76.3 mL    LVED Vol Index BP 36.8 mL/m2    Left Atrium Major Axis 3.59 cm    LV ED Vol BP 66.7 56 - 104 ml    LVED Vol Index A4C 42.1 mL/m2    LVED Vol Index A2C 27.4 mL/m2    LVES Vol Index A4C 22.3 mL/m2    LVES Vol Index A2C 13.0 mL/m2    Left Ventricular Fractional Shortening by 2D 40.291628143 %   GLUCOSE, POC    Collection Time: 02/10/20  4:56 PM   Result Value Ref Range    Glucose (POC) 119 (H) 65 - 100 mg/dL    Performed by Rachid Trinidad    GLUCOSE, POC    Collection Time: 02/10/20  9:19 PM   Result Value Ref Range    Glucose (POC) 148 (H) 65 - 100 mg/dL    Performed by Jose Santos    CBC WITH AUTOMATED DIFF    Collection Time: 02/11/20  2:05 AM   Result Value Ref Range    WBC 8.5 3.6 - 11.0 K/uL    RBC 3.25 (L) 3.80 - 5.20 M/uL    HGB 9.4 (L) 11.5 - 16.0 g/dL    HCT 29.0 (L) 35.0 - 47.0 %    MCV 89.2 80.0 - 99.0 FL    MCH 28.9 26.0 - 34.0 PG    MCHC 32.4 30.0 - 36.5 g/dL    RDW 19.4 (H) 11.5 - 14.5 %    PLATELET 043 687 - 150 K/uL    MPV 9.8 8.9 - 12.9 FL    NRBC 0.0 0  WBC    ABSOLUTE NRBC 0.00 0.00 - 0.01 K/uL    NEUTROPHILS 65 32 - 75 %    LYMPHOCYTES 21 12 - 49 %    MONOCYTES 11 5 - 13 %    EOSINOPHILS 1 0 - 7 %    BASOPHILS 1 0 - 1 %    IMMATURE GRANULOCYTES 1 (H) 0.0 - 0.5 %    ABS. NEUTROPHILS 5.5 1.8 - 8.0 K/UL    ABS. LYMPHOCYTES 1.8 0.8 - 3.5 K/UL    ABS. MONOCYTES 0.9 0.0 - 1.0 K/UL    ABS. EOSINOPHILS 0.1 0.0 - 0.4 K/UL    ABS. BASOPHILS 0.1 0.0 - 0.1 K/UL    ABS. IMM.  GRANS. 0.1 (H) 0.00 - 0.04 K/UL    DF AUTOMATED     METABOLIC PANEL, COMPREHENSIVE    Collection Time: 02/11/20  2:05 AM   Result Value Ref Range    Sodium 134 (L) 136 - 145 mmol/L    Potassium 4.7 3.5 - 5.1 mmol/L    Chloride 104 97 - 108 mmol/L    CO2 26 21 - 32 mmol/L    Anion gap 4 (L) 5 - 15 mmol/L    Glucose 122 (H) 65 - 100 mg/dL    BUN 13 6 - 20 MG/DL    Creatinine 0.50 (L) 0.55 - 1.02 MG/DL    BUN/Creatinine ratio 26 (H) 12 - 20 GFR est AA >60 >60 ml/min/1.73m2    GFR est non-AA >60 >60 ml/min/1.73m2    Calcium 8.9 8.5 - 10.1 MG/DL    Bilirubin, total 0.4 0.2 - 1.0 MG/DL    ALT (SGPT) 285 (H) 12 - 78 U/L    AST (SGOT) 94 (H) 15 - 37 U/L    Alk. phosphatase 177 (H) 45 - 117 U/L    Protein, total 5.5 (L) 6.4 - 8.2 g/dL    Albumin 2.5 (L) 3.5 - 5.0 g/dL    Globulin 3.0 2.0 - 4.0 g/dL    A-G Ratio 0.8 (L) 1.1 - 2.2     GLUCOSE, POC    Collection Time: 02/11/20  5:36 AM   Result Value Ref Range    Glucose (POC) 143 (H) 65 - 100 mg/dL    Performed by Mary Kay Bosch MD  [address]  Phone - [phone]   Fax - [phone]  [email or web address]

## 2020-02-11 NOTE — CONSULTS
Cardiology Consult/Progress Note         Subjective: Feels much better. Not excited about taking additional BP meds. Drain removed yesterday. Investigation  Telemetry: paced rhythm  ECG: paced phythm  Echocardiogram: bedside echo with large pericardial effusion, paritial RC collapse. Echo 2/10/2010: Near complete resolution of effusion. Assessment and PLAN   1. History of recurrent paroxysmal A. Fib s/p AV roseanne ablation  2. BiV pacemaker placement for nonischemic cardiomyopathy in December 2019  3. History of nonischemic cardiomyopathy  4. Likely recent viral illness causing symptoms of nausea vomiting lethargy and hypothermia  5. Large pericardial effusion likely related to #4 with signs of impending pericardial tamponade s/p pericardiocentesis     Mrs. Reyes Flaming has improved dramatically after pericardiocentesis. We will try to treat her as pericarditis with initiation of nonsteroidal anti-inflammatory agents. NO colchicine due to GI intolerance. Jasmyn Holm Uncertain why she had pericarditis. Could be viral. Slight concern on CT that RV lead apically dsplaced but lack of reaccumulatation suggest against persistent slow leak. Patient does not want Losartan. Will d/c. May increase Coreg if needed. Plan to ambulate today. If feels string, then d/c home today, otherwise tomorrow. May be moved out of ICU. Home tomorrow. Plan for follow up echo on Friday and follow up with  the same day. []    High complexity decision making was performed  []    Patient is at high-risk of decompensation with multiple organ involvement      2/8/2020 10:28 PM   Pericardial effusion [I31.3]   HPI: Amy Russell is a 78 y.o. female admitted for progressive lethargy weakness and Pericardial effusion [I31.3]. Over the past 2 weeks she has reported progressive lethargy weakness poor appetite nausea vomiting and febrile feeling.   She also had a near syncopal spell about 5 days ago which was managed with IV hydration. Her oral intake has been poor for the last few days. In the last 3 days he also noted increasing tightness in the upper chest with pain on deep breathing. Over the past 24 hours she was singly lethargic poorly responsive and not able to keep anything down and was brought into the emergency room. Further evaluation identified signs of hypoperfusion, hypotension increased lactate. Chest x-ray demonstrated increased cardiac silhouette and chest CT demonstrated large pericardial effusion with small RV dimensions. Review of Symptoms:  Respiratory: No exertional dyspnea, orthopnea, PND, cough, hemoptysis, URI. Cardiovascular: No CP, palpitations, sweating, lightheadedness, dizziness, syncope, presyncope, lower extremity swelling. Otherwise no other pertinent positive or negative symptoms on ROS.      Patient Active Problem List    Diagnosis Date Noted    Pericardial effusion 02/09/2020    Acquired hypothyroidism 07/12/2017    Controlled type 2 diabetes mellitus without complication ([address] 39/67/8110    A-fib ([address]     Neck pain on right side 01/03/2014    Polymyalgia rheumatica ([address] 01/03/2014    Thyroid nodule 01/03/2014    Hypertension 08/14/2009      Ny Raymond MD  Past Medical History:   Diagnosis Date    A-fib [facility])     Arthritis     OA    Diabetes ([address]     PRE DIABETIC    GERD (gastroesophageal reflux disease)     Hypertension     Ill-defined condition     MACULAR DEGENERATION    Polymyalgia ([address]     Thyroid disease       Past Surgical History:   Procedure Laterality Date    HX APPENDECTOMY      HX BLEPHAROPLASTY      HX CATARACT REMOVAL Bilateral     HX DILATION AND CURETTAGE      HX ORTHOPAEDIC      RIGHT AND LEFT ARTHROSCOPIC SURGERY    HX ORTHOPAEDIC      RIGHT AND LEFT KNEE PARTIAL REPLACEMENTS JOINTS     Social History     Socioeconomic History    Marital status:      Spouse name: Not on file    Number of children: Not on file  Years of education: Not on file    Highest education level: Not on file   Tobacco Use    Smoking status: Former Smoker    Smokeless tobacco: Never Used   Substance and Sexual Activity    Alcohol use: Yes     Alcohol/week: 1.0 standard drinks     Types: 1 Glasses of wine per week     Comment: RARE    Drug use: No     Family History   Problem Relation Age of Onset    Stroke Mother     Diabetes Mother     Migraines Mother     Cancer Father         PROSTATE CANCER AND COLON CANCER    Liver Disease Father         HAD BLOOD TRANSFUSION,  OF CIRRHOSIS OF LIVER, WAS NON DRINKER    Diabetes Brother     Anesth Problems Neg Hx       Current Facility-Administered Medications   Medication Dose Route Frequency    lidocaine 4 % patch 2 Patch  2 Patch TransDERmal Q24H    levothyroxine (SYNTHROID) tablet 100 mcg  100 mcg Oral 6am    pantoprazole (PROTONIX) tablet 40 mg  40 mg Oral ACB    losartan (COZAAR) tablet 25 mg  25 mg Oral DAILY    sodium chloride (NS) flush 5-40 mL  5-40 mL IntraVENous Q8H    sodium chloride (NS) flush 5-40 mL  5-40 mL IntraVENous PRN    acetaminophen (TYLENOL) tablet 650 mg  650 mg Oral Q4H PRN    ondansetron (ZOFRAN) injection 4 mg  4 mg IntraVENous Q4H PRN    magnesium hydroxide (MILK OF MAGNESIA) 400 mg/5 mL oral suspension 30 mL  30 mL Oral DAILY PRN    influenza vaccine 2019- (6 mos+)(PF) (FLUARIX/FLULAVAL/FLUZONE QUAD) injection 0.5 mL  0.5 mL IntraMUSCular PRIOR TO DISCHARGE    carvediloL (COREG) tablet 6.25 mg  6.25 mg Oral BID WITH MEALS    glucose chewable tablet 16 g  4 Tab Oral PRN    glucagon (GLUCAGEN) injection 1 mg  1 mg IntraMUSCular PRN    dextrose 10% infusion 0-250 mL  0-250 mL IntraVENous PRN    insulin lispro (HUMALOG) injection   SubCUTAneous TIDAC    sodium chloride (NS) flush 5-10 mL  5-10 mL IntraVENous PRN      Prior to Admission Medications   Prescriptions Last Dose Informant Patient Reported? Taking?    CALCIUM-MAGNESIUM PO   Yes No   Sig: Take 600 mg by mouth. VIT C/E/ZN/COPPR/LUTEIN/ZEAXAN (PRESERVISION AREDS 2 PO)   Yes No   Sig: Take  by mouth. XARELTO 20 mg tab tablet   No No   Sig: TAKE ONE TABLET BY MOUTH DAILY   amLODIPine (NORVASC) 2.5 mg tablet   Yes No   Sig: Take 2.5 mg by mouth. cholecalciferol (VITAMIN D3) 1,000 unit cap   Yes No   Sig: Take 1,000 Units by mouth daily. dietary supplement cap   Yes No   Sig: Take  by mouth. digestive enzymes cap   Yes No   Sig: Take  by mouth. dilTIAZem CD (CARTIA XT) 120 mg ER capsule   Yes No   Sig: Take  by mouth daily. glucosamine (GLUCOSAMINE RELIEF) 1,000 mg tab   Yes No   Sig: Take  by mouth. WITH CHONDROINTIN 1500MG DAILY   levothyroxine (SYNTHROID) 100 mcg tablet   Yes No   Sig: Take 100 mcg by mouth Daily (before breakfast). magnesium 250 mg tab   Yes No   Sig: Take  by mouth.   mecobalamin, vitamin B12, 1,000 mcg TbDi   Yes No   Sig: Place under the tongue.    pantoprazole (PROTONIX) 20 mg tablet   Yes No      Facility-Administered Medications: None      Allergies   Allergen Reactions    Ciprofloxacin Myalgia    Quinolones Other (comments)     EYES CAN'T FOCUS, PATIENT CANT WALK        Labs:   Recent Results (from the past 24 hour(s))   GLUCOSE, POC    Collection Time: 02/10/20 11:28 AM   Result Value Ref Range    Glucose (POC) 145 (H) 65 - 100 mg/dL    Performed by Rober MANCERA ADULT FOLLOW-UP OR LIMITED    Collection Time: 02/10/20  3:25 PM   Result Value Ref Range    Ao Root D 3.05 cm    LVIDd 4.44 3.9 - 5.3 cm    LVPWd 0.94 (A) 0.6 - 0.9 cm    LVIDs 3.20 cm    IVSd 0.98 (A) 0.6 - 0.9 cm    LV ED Vol A2C 49.7 mL    LV ES Vol A4C 40.4 mL    LV ES Vol BP 31.2 19 - 49 mL    Left Atrium to Aortic Root Ratio 1.18     RVIDd 2.59 cm    BP EF 53.2 (A) 55 - 100 %    LV Ejection Fraction MOD 4C 47 %    LV Ejection Fraction MOD 2C 53 %    LV Mass .8 (A) 67 - 162 g    LV Mass AL Index 89.7 43 - 95 g/m2    LV ES Vol A2C 23.5 mL    LVES Vol Index BP 17.2 mL/m2    LV ED Vol A4C 76.3 mL    LVED Vol Index BP 36.8 mL/m2    Left Atrium Major Axis 3.59 cm    LV ED Vol BP 66.7 56 - 104 ml    LVED Vol Index A4C 42.1 mL/m2    LVED Vol Index A2C 27.4 mL/m2    LVES Vol Index A4C 22.3 mL/m2    LVES Vol Index A2C 13.0 mL/m2    Left Ventricular Fractional Shortening by 2D 23.815066201 %   GLUCOSE, POC    Collection Time: 02/10/20  4:56 PM   Result Value Ref Range    Glucose (POC) 119 (H) 65 - 100 mg/dL    Performed by Stacei Jim    GLUCOSE, POC    Collection Time: 02/10/20  9:19 PM   Result Value Ref Range    Glucose (POC) 148 (H) 65 - 100 mg/dL    Performed by Eladia George    CBC WITH AUTOMATED DIFF    Collection Time: 02/11/20  2:05 AM   Result Value Ref Range    WBC 8.5 3.6 - 11.0 K/uL    RBC 3.25 (L) 3.80 - 5.20 M/uL    HGB 9.4 (L) 11.5 - 16.0 g/dL    HCT 29.0 (L) 35.0 - 47.0 %    MCV 89.2 80.0 - 99.0 FL    MCH 28.9 26.0 - 34.0 PG    MCHC 32.4 30.0 - 36.5 g/dL    RDW 19.4 (H) 11.5 - 14.5 %    PLATELET 424 191 - 137 K/uL    MPV 9.8 8.9 - 12.9 FL    NRBC 0.0 0  WBC    ABSOLUTE NRBC 0.00 0.00 - 0.01 K/uL    NEUTROPHILS 65 32 - 75 %    LYMPHOCYTES 21 12 - 49 %    MONOCYTES 11 5 - 13 %    EOSINOPHILS 1 0 - 7 %    BASOPHILS 1 0 - 1 %    IMMATURE GRANULOCYTES 1 (H) 0.0 - 0.5 %    ABS. NEUTROPHILS 5.5 1.8 - 8.0 K/UL    ABS. LYMPHOCYTES 1.8 0.8 - 3.5 K/UL    ABS. MONOCYTES 0.9 0.0 - 1.0 K/UL    ABS. EOSINOPHILS 0.1 0.0 - 0.4 K/UL    ABS. BASOPHILS 0.1 0.0 - 0.1 K/UL    ABS. IMM.  GRANS. 0.1 (H) 0.00 - 0.04 K/UL    DF AUTOMATED     METABOLIC PANEL, COMPREHENSIVE    Collection Time: 02/11/20  2:05 AM   Result Value Ref Range    Sodium 134 (L) 136 - 145 mmol/L    Potassium 4.7 3.5 - 5.1 mmol/L    Chloride 104 97 - 108 mmol/L    CO2 26 21 - 32 mmol/L    Anion gap 4 (L) 5 - 15 mmol/L    Glucose 122 (H) 65 - 100 mg/dL    BUN 13 6 - 20 MG/DL    Creatinine 0.50 (L) 0.55 - 1.02 MG/DL    BUN/Creatinine ratio 26 (H) 12 - 20      GFR est AA >60 >60 ml/min/1.73m2    GFR est non-AA >60 >60 ml/min/1.73m2    Calcium 8.9 8.5 - 10.1 MG/DL    Bilirubin, total 0.4 0.2 - 1.0 MG/DL    ALT (SGPT) 285 (H) 12 - 78 U/L    AST (SGOT) 94 (H) 15 - 37 U/L    Alk. phosphatase 177 (H) 45 - 117 U/L    Protein, total 5.5 (L) 6.4 - 8.2 g/dL    Albumin 2.5 (L) 3.5 - 5.0 g/dL    Globulin 3.0 2.0 - 4.0 g/dL    A-G Ratio 0.8 (L) 1.1 - 2.2     GLUCOSE, POC    Collection Time: 02/11/20  5:36 AM   Result Value Ref Range    Glucose (POC) 143 (H) 65 - 100 mg/dL    Performed by Rey Hamilton        Intake/Output Summary (Last 24 hours) at 2/11/2020 1021  Last data filed at 2/11/2020 0928  Gross per 24 hour   Intake 120 ml   Output 1562 ml   Net -1442 ml      Patient Vitals for the past 24 hrs:   Temp Pulse Resp BP SpO2   02/11/20 0900 -- 75 19 (!) 157/91 --   02/11/20 0800 97.8 °F (36.6 °C) 81 16 (!) 150/97 98 %   02/11/20 0700 -- 78 18 134/73 100 %   02/11/20 0600 -- 75 19 123/76 99 %   02/11/20 0500 -- 77 20 129/88 100 %   02/11/20 0400 98 °F (36.7 °C) 75 18 101/64 99 %   02/11/20 0300 -- 75 18 129/73 99 %   02/11/20 0200 -- 75 29 130/60 96 %   02/11/20 0100 -- 75 17 117/71 100 %   02/11/20 0000 98.7 °F (37.1 °C) 75 18 106/63 100 %   02/10/20 2300 -- 76 19 125/73 98 %   02/10/20 2200 -- 75 22 118/64 99 %   02/10/20 2100 -- 77 18 105/60 99 %   02/10/20 2034 -- -- -- -- 98 %   02/10/20 2000 98 °F (36.7 °C) 76 18 116/53 93 %   02/10/20 1900 -- 77 25 113/63 100 %   02/10/20 1800 -- 77 16 114/67 100 %   02/10/20 1700 -- 76 24 118/69 98 %   02/10/20 1600 98.5 °F (36.9 °C) 75 18 101/61 --   02/10/20 1519 -- -- -- 121/85 --   02/10/20 1400 -- 76 23 121/85 100 %   02/10/20 1300 -- 76 23 105/54 95 %   02/10/20 1200 98.6 °F (37 °C) 76 21 107/67 98 %   02/10/20 1100 -- 76 22 99/72 98 %      General:    Pale, drowsy and in distress. Psychiatric:    Normal Mood and affect    Eye/ENT:      Pupils equal, No asymmetry, Conjunctival pink.  Able to hear voice at normal amplitude   Lungs:      Visibly symmetric chest expansion, No palpable tenderness. Clear to auscultation bilaterally. Heart[de-identified]    Regular rate and rhythm, S1, S2 normal, no murmur, click, rub or gallop. No JVD, Normal palpable peripheral pulses. No cyanosis. Significantly engorged neck veins   Abdomen:     Soft, non-tender. Bowel sounds normal. No masses,  No      organomegaly. Extremities:   Extremities normal, atraumatic, no edema. Neurologic:   CN II-XII grossly intact.  No gross focal deficits         Joan Escobar MD  2/11/2020   4:18 AM     [facility] Office:   [address] [facility] Office:  [address] Michael [facility]  [address]     [address] [place] [address]  P: [phone]    P: [phone]  F: [phone]    F: [phone]

## 2020-02-11 NOTE — CONSULTS
Cardiology Consult/Progress Note           2/8/2020 10:28 PM   Pericardial effusion [I31.3]   HPI: Lashay Bolton is a 78 y.o. female admitted for progressive lethargy weakness and Pericardial effusion [I31.3]. Over the past 2 weeks she has reported progressive lethargy weakness poor appetite nausea vomiting and febrile feeling. She also had a near syncopal spell about 5 days ago which was managed with IV hydration. Her oral intake has been poor for the last few days. In the last 3 days he also noted increasing tightness in the upper chest with pain on deep breathing. Over the past 24 hours she was singly lethargic poorly responsive and not able to keep anything down and was brought into the emergency room. Further evaluation identified signs of hypoperfusion, hypotension increased lactate. Chest x-ray demonstrated increased cardiac silhouette and chest CT demonstrated large pericardial effusion with small RV dimensions. Investigation  Telemetry: paced rhythm  ECG: paced phythm  Echocardiogram: bedside echo with large pericardial effusion, paritial RC collapse. Echo 2/10/2010: Near complete resolution of effusion. Assessment and PLAN   1. History of recurrent paroxysmal A. fib  2. By V pacemaker placement for nonischemic cardiomyopathy in December 2019  3. History of nonischemic cardiomyopathy  4. Likely recent viral illness causing symptoms of nausea vomiting lethargy and hypothermia  5. Large pericardial effusion likely related to #4 with signs of impending pericardial tamponade. Mrs. Rubén Ornelas has improved dramatically after pericardiocentesis. We will try to treat her as pericarditis with initiation of nonsteroidal anti-inflammatory agents and possibly colchicine if she can tolerate it from a GI perspective. Uncertain why she had pericarditis.  Initially felt to be viral but such a dramatic improvement with percardiocentesis makes me feel that effusion/pericarditis was primary problem. Slight concern on CT that RV lead apically dsplaced but lack of reaccumulatation suggest against persistent slow leak. Pericardial drain removes as output for previous 6 hours was nil. LV function borderline. Will start Losartan in addition to coreg. [x]    High complexity decision making was performed  []    Patient is at high-risk of decompensation with multiple organ involvement     Review of Symptoms:  Respiratory: No exertional dyspnea, orthopnea, PND, cough, hemoptysis, URI. Cardiovascular: No CP, palpitations, sweating, lightheadedness, dizziness, syncope, presyncope, lower extremity swelling. Otherwise no other pertinent positive or negative symptoms on ROS.      Patient Active Problem List    Diagnosis Date Noted    Pericardial effusion 02/09/2020    Acquired hypothyroidism 07/12/2017    Controlled type 2 diabetes mellitus without complication ([address] 34/52/7186    A-fib [facility]     Neck pain on right side 01/03/2014    Polymyalgia rheumatica ([address] 01/03/2014    Thyroid nodule 01/03/2014    Hypertension 08/14/2009      Jennyfer James MD  Past Medical History:   Diagnosis Date    A-fib [facility])     Arthritis     OA    Diabetes ([address]     PRE DIABETIC    GERD (gastroesophageal reflux disease)     Hypertension     Ill-defined condition     MACULAR DEGENERATION    Polymyalgia ([address]     Thyroid disease       Past Surgical History:   Procedure Laterality Date    HX APPENDECTOMY      HX BLEPHAROPLASTY      HX CATARACT REMOVAL Bilateral     HX DILATION AND CURETTAGE      HX ORTHOPAEDIC      RIGHT AND LEFT ARTHROSCOPIC SURGERY    HX ORTHOPAEDIC      RIGHT AND LEFT KNEE PARTIAL REPLACEMENTS JOINTS     Social History     Socioeconomic History    Marital status:      Spouse name: Not on file    Number of children: Not on file    Years of education: Not on file    Highest education level: Not on file   Tobacco Use    Smoking status: Former Smoker    Smokeless tobacco: Never Used   Substance and Sexual Activity    Alcohol use: Yes     Alcohol/week: 1.0 standard drinks     Types: 1 Glasses of wine per week     Comment: RARE    Drug use: No     Family History   Problem Relation Age of Onset    Stroke Mother     Diabetes Mother     Migraines Mother     Cancer Father         PROSTATE CANCER AND COLON CANCER    Liver Disease Father         HAD BLOOD TRANSFUSION,  OF CIRRHOSIS OF LIVER, WAS NON DRINKER    Diabetes Brother     Anesth Problems Neg Hx       Current Facility-Administered Medications   Medication Dose Route Frequency    lidocaine 4 % patch 2 Patch  2 Patch TransDERmal Q24H    [START ON 2020] levothyroxine (SYNTHROID) tablet 100 mcg  100 mcg Oral 6am    [START ON 2020] pantoprazole (PROTONIX) tablet 40 mg  40 mg Oral ACB    sodium chloride (NS) flush 5-40 mL  5-40 mL IntraVENous Q8H    sodium chloride (NS) flush 5-40 mL  5-40 mL IntraVENous PRN    acetaminophen (TYLENOL) tablet 650 mg  650 mg Oral Q4H PRN    ondansetron (ZOFRAN) injection 4 mg  4 mg IntraVENous Q4H PRN    magnesium hydroxide (MILK OF MAGNESIA) 400 mg/5 mL oral suspension 30 mL  30 mL Oral DAILY PRN    influenza vaccine - (6 mos+)(PF) (FLUARIX/FLULAVAL/FLUZONE QUAD) injection 0.5 mL  0.5 mL IntraMUSCular PRIOR TO DISCHARGE    carvediloL (COREG) tablet 6.25 mg  6.25 mg Oral BID WITH MEALS    glucose chewable tablet 16 g  4 Tab Oral PRN    glucagon (GLUCAGEN) injection 1 mg  1 mg IntraMUSCular PRN    dextrose 10% infusion 0-250 mL  0-250 mL IntraVENous PRN    insulin lispro (HUMALOG) injection   SubCUTAneous TIDAC    sodium chloride (NS) flush 5-10 mL  5-10 mL IntraVENous PRN      Prior to Admission Medications   Prescriptions Last Dose Informant Patient Reported? Taking? CALCIUM-MAGNESIUM PO   Yes No   Sig: Take 600 mg by mouth. VIT C/E/ZN/COPPR/LUTEIN/ZEAXAN (PRESERVISION AREDS 2 PO)   Yes No   Sig: Take  by mouth.    XARELTO 20 mg tab tablet No No   Sig: TAKE ONE TABLET BY MOUTH DAILY   amLODIPine (NORVASC) 2.5 mg tablet   Yes No   Sig: Take 2.5 mg by mouth. cholecalciferol (VITAMIN D3) 1,000 unit cap   Yes No   Sig: Take 1,000 Units by mouth daily. dietary supplement cap   Yes No   Sig: Take  by mouth. digestive enzymes cap   Yes No   Sig: Take  by mouth. dilTIAZem CD (CARTIA XT) 120 mg ER capsule   Yes No   Sig: Take  by mouth daily. glucosamine (GLUCOSAMINE RELIEF) 1,000 mg tab   Yes No   Sig: Take  by mouth. WITH CHONDROINTIN 1500MG DAILY   levothyroxine (SYNTHROID) 100 mcg tablet   Yes No   Sig: Take 100 mcg by mouth Daily (before breakfast). magnesium 250 mg tab   Yes No   Sig: Take  by mouth.   mecobalamin, vitamin B12, 1,000 mcg TbDi   Yes No   Sig: Place under the tongue. pantoprazole (PROTONIX) 20 mg tablet   Yes No      Facility-Administered Medications: None      Allergies   Allergen Reactions    Ciprofloxacin Myalgia    Quinolones Other (comments)     EYES CAN'T FOCUS, PATIENT CANT WALK        Labs:   Recent Results (from the past 24 hour(s))   GLUCOSE, POC    Collection Time: 02/09/20 10:34 PM   Result Value Ref Range    Glucose (POC) 147 (H) 65 - 100 mg/dL    Performed by Delfina Tucker    CBC WITH AUTOMATED DIFF    Collection Time: 02/10/20  3:00 AM   Result Value Ref Range    WBC 9.6 3.6 - 11.0 K/uL    RBC 3.21 (L) 3.80 - 5.20 M/uL    HGB 9.3 (L) 11.5 - 16.0 g/dL    HCT 28.6 (L) 35.0 - 47.0 %    MCV 89.1 80.0 - 99.0 FL    MCH 29.0 26.0 - 34.0 PG    MCHC 32.5 30.0 - 36.5 g/dL    RDW 19.5 (H) 11.5 - 14.5 %    PLATELET 541 129 - 668 K/uL    MPV 11.1 8.9 - 12.9 FL    NRBC 0.0 0  WBC    ABSOLUTE NRBC 0.00 0.00 - 0.01 K/uL    NEUTROPHILS 74 32 - 75 %    LYMPHOCYTES 15 12 - 49 %    MONOCYTES 10 5 - 13 %    EOSINOPHILS 0 0 - 7 %    BASOPHILS 0 0 - 1 %    IMMATURE GRANULOCYTES 1 (H) 0.0 - 0.5 %    ABS. NEUTROPHILS 7.0 1.8 - 8.0 K/UL    ABS. LYMPHOCYTES 1.5 0.8 - 3.5 K/UL    ABS. MONOCYTES 0.9 0.0 - 1.0 K/UL    ABS. EOSINOPHILS 0.0 0.0 - 0.4 K/UL    ABS. BASOPHILS 0.0 0.0 - 0.1 K/UL    ABS. IMM. GRANS. 0.1 (H) 0.00 - 0.04 K/UL    DF AUTOMATED     METABOLIC PANEL, COMPREHENSIVE    Collection Time: 02/10/20  3:00 AM   Result Value Ref Range    Sodium 131 (L) 136 - 145 mmol/L    Potassium 4.3 3.5 - 5.1 mmol/L    Chloride 102 97 - 108 mmol/L    CO2 24 21 - 32 mmol/L    Anion gap 5 5 - 15 mmol/L    Glucose 114 (H) 65 - 100 mg/dL    BUN 22 (H) 6 - 20 MG/DL    Creatinine 0.62 0.55 - 1.02 MG/DL    BUN/Creatinine ratio 35 (H) 12 - 20      GFR est AA >60 >60 ml/min/1.73m2    GFR est non-AA >60 >60 ml/min/1.73m2    Calcium 8.8 8.5 - 10.1 MG/DL    Bilirubin, total 0.5 0.2 - 1.0 MG/DL    ALT (SGPT) 387 (H) 12 - 78 U/L    AST (SGOT) 201 (H) 15 - 37 U/L    Alk.  phosphatase 192 (H) 45 - 117 U/L    Protein, total 5.3 (L) 6.4 - 8.2 g/dL    Albumin 2.6 (L) 3.5 - 5.0 g/dL    Globulin 2.7 2.0 - 4.0 g/dL    A-G Ratio 1.0 (L) 1.1 - 2.2     GLUCOSE, POC    Collection Time: 02/10/20  6:36 AM   Result Value Ref Range    Glucose (POC) 133 (H) 65 - 100 mg/dL    Performed by [address], POC    Collection Time: 02/10/20 11:28 AM   Result Value Ref Range    Glucose (POC) 145 (H) 65 - 100 mg/dL    Performed by Amy Yuen    ECHO ADULT FOLLOW-UP OR LIMITED    Collection Time: 02/10/20  3:25 PM   Result Value Ref Range    Ao Root D 3.05 cm    LVIDd 4.44 3.9 - 5.3 cm    LVPWd 0.94 (A) 0.6 - 0.9 cm    LVIDs 3.20 cm    IVSd 0.98 (A) 0.6 - 0.9 cm    LV ED Vol A2C 49.7 mL    LV ES Vol A4C 40.4 mL    LV ES Vol BP 31.2 19 - 49 mL    Left Atrium to Aortic Root Ratio 1.18     RVIDd 2.59 cm    BP EF 53.2 (A) 55 - 100 %    LV Ejection Fraction MOD 4C 47 %    LV Ejection Fraction MOD 2C 53 %    LV Mass .8 (A) 67 - 162 g    LV Mass AL Index 89.7 43 - 95 g/m2    LV ES Vol A2C 23.5 mL    LVES Vol Index BP 17.2 mL/m2    LV ED Vol A4C 76.3 mL    LVED Vol Index BP 36.8 mL/m2    Left Atrium Major Axis 3.59 cm    LV ED Vol BP 66.7 56 - 104 ml    LVED Vol Index A4C 42.1 mL/m2    LVED Vol Index A2C 27.4 mL/m2    LVES Vol Index A4C 22.3 mL/m2    LVES Vol Index A2C 13.0 mL/m2    Left Ventricular Fractional Shortening by 2D [id number] %   GLUCOSE, POC    Collection Time: 02/10/20  4:56 PM   Result Value Ref Range    Glucose (POC) 119 (H) 65 - 100 mg/dL    Performed by Serena Noble        Intake/Output Summary (Last 24 hours) at 2/10/2020 2059  Last data filed at 2/10/2020 1600  Gross per 24 hour   Intake --   Output 1027 ml   Net -1027 ml      Patient Vitals for the past 24 hrs:   Temp Pulse Resp BP SpO2   02/10/20 2034 -- -- -- -- 98 %   02/10/20 1900 -- 77 25 113/63 100 %   02/10/20 1800 -- 77 16 114/67 100 %   02/10/20 1700 -- 76 24 118/69 98 %   02/10/20 1600 98.5 °F (36.9 °C) 75 18 101/61 --   02/10/20 1519 -- -- -- 121/85 --   02/10/20 1400 -- 76 23 121/85 100 %   02/10/20 1300 -- 76 23 105/54 95 %   02/10/20 1200 98.6 °F (37 °C) 76 21 107/67 98 %   02/10/20 1100 -- 76 22 99/72 98 %   02/10/20 0900 -- 77 24 104/67 (!) 88 %   02/10/20 0800 98.7 °F (37.1 °C) 79 23 124/70 92 %   02/10/20 0700 -- 75 16 111/71 99 %   02/10/20 0600 -- 75 19 111/64 97 %   02/10/20 0500 -- 75 (!) 36 107/68 96 %   02/10/20 0400 98.8 °F (37.1 °C) 76 20 114/74 96 %   02/10/20 0300 -- 75 18 108/72 97 %   02/10/20 0200 -- 75 17 115/70 96 %   02/10/20 0100 -- 75 17 112/79 95 %   02/10/20 0000 99.5 °F (37.5 °C) 75 18 (!) 85/56 99 %   02/09/20 2300 -- 75 17 94/62 99 %   02/09/20 2200 -- 75 18 91/57 98 %   02/09/20 2100 -- 75 17 102/56 99 %      General:    Pale, drowsy and in distress. Psychiatric:    Normal Mood and affect    Eye/ENT:      Pupils equal, No asymmetry, Conjunctival pink. Able to hear voice at normal amplitude   Lungs:      Visibly symmetric chest expansion, No palpable tenderness. Clear to auscultation bilaterally. Heart[de-identified]    Regular rate and rhythm, S1, S2 normal, no murmur, click, rub or gallop. No JVD, Normal palpable peripheral pulses. No cyanosis.  Significantly engorged neck veins   Abdomen:     Soft, non-tender. Bowel sounds normal. No masses,  No      organomegaly. Extremities:   Extremities normal, atraumatic, no edema. Neurologic:   CN II-XII grossly intact.  No gross focal deficits         Leonardo Brooks MD  2/10/2020   4:18 AM     [facility] Crossing Office:   [place] Office:  [address] [place] [address] Dr Michael Chavez [facility]  [address]     [address] [place] [address]  P: [phone]    P: [phone]  F: [phone]    F: [phone]

## 2020-02-11 NOTE — CDMP QUERY
Q1 Pt admitted with Pericardial Effusion Pt noted to have Mild LAM. If possible, please document in the progress notes and d/c summary if you are evaluating and / or treating any of the following: ? Acute kidney injury POA ? Acute kidney insufficiency POA ? Acute kidney injury resolved 
? Unable to determine ? Unknown The medical record reflects the following: 
  Risk Factors: Volume depletion, per ED PN 2/8/20 pt with c/o decreased appetite, loose stools, vomiting, near syncopal episode. Clinical Indicators: 2/9/20 Renal PN Mild LAM from volume depletion. 2/8/2020 22:48 BUN: 35 (H) Creatinine: 1.19 (H) GFR est non-AA: 43 (L) 
2/9/2020 05:54 BUN: 34 (H) Creatinine: 1.07 (H) GFR est non-AA: 49 (L) 
2/10/2020 03:00 BUN: 22 (H) Creatinine: 0.62 GFR est non-AA: >60 Treatment: Stop diuretics, Recheck labs RIFLE  (BSV Approved) RISK:  Increased SCr x 1.5 or GFR decrease > 25% (within 7 days) INJURY:  Increased SCr x 2.0 or GFR decreased > 50% FAILURE:  Increased SCr x 3.0 or GFR decrease > 75% or SCr >4.0 mg/dL or acute increase >0.5 mg/dL LOSS:  Persistent acute renal failure = complete loss of kidney function > 4 weeks END STAGE:  End stage of kidney disease > 3 months

## 2020-02-11 NOTE — PROGRESS NOTES
0730 Bedside and Verbal shift change report given to Hanna Nichols (oncoming nurse) by Glory (offgoing nurse). Report included the following information SBAR, Kardex, Procedure Summary, Intake/Output, MAR, Accordion and Recent Results. 0830 Pt up to chair, tolerated well. 0850 Pt does not want to take losartan, wishes to speak with doctor before taking. Losartan held. 1030 Pt walked unit, became tired after 1 lap. VSS.      1225 Dr. Maggie Robles @ bedside, will keep patient overnight     1300 Pt walked unit 2 times, tolerated well. VSS    1600 Pt walked unit 2 times, tolerated well. VSS    1830 Pt walked unit 3 times, tolerated well. VSS    1930 Bedside and Verbal shift change report given to Sayra Joseph (oncoming nurse) by Hanna Nichols (offgoing nurse). Report included the following information SBAR, Kardex, ED Summary, Procedure Summary, Intake/Output, MAR, Accordion and Recent Results.

## 2020-02-11 NOTE — PROGRESS NOTES
Problem: Pain - Acute  Goal: *Control of acute pain  Outcome: Progressing Towards Goal     Problem: Patient Education: Go to Patient Education Activity  Goal: Patient/Family Education  Outcome: Progressing Towards Goal     Problem: Patient Education: Go to Patient Education Activity  Goal: Patient/Family Education  Outcome: Progressing Towards Goal     Problem: Heart Failure: Day 2  Goal: Off Pathway (Use only if patient is Off Pathway)  Outcome: Progressing Towards Goal  Goal: Activity/Safety  Outcome: Progressing Towards Goal  Goal: Consults, if ordered  Outcome: Progressing Towards Goal  Goal: Diagnostic Test/Procedures  Outcome: Progressing Towards Goal  Goal: Nutrition/Diet  Outcome: Progressing Towards Goal  Goal: Discharge Planning  Outcome: Progressing Towards Goal  Goal: Medications  Outcome: Progressing Towards Goal  Goal: Respiratory  Outcome: Progressing Towards Goal  Goal: Treatments/Interventions/Procedures  Outcome: Progressing Towards Goal  Goal: Psychosocial  Outcome: Progressing Towards Goal  Goal: *Oxygen saturation within defined limits  Outcome: Progressing Towards Goal  Goal: *Hemodynamically stable  Outcome: Progressing Towards Goal  Goal: *Optimal pain control at patient's stated goal  Outcome: Progressing Towards Goal  Goal: *Anxiety reduced or absent  Outcome: Progressing Towards Goal  Goal: *Demonstrates progressive activity  Outcome: Progressing Towards Goal     Problem: Heart Failure: Discharge Outcomes  Goal: *Demonstrates ability to perform prescribed activity without shortness of breath or discomfort  Outcome: Progressing Towards Goal  Goal: *Left ventricular function assessment completed prior to or during stay, or planned for post-discharge  Outcome: Progressing Towards Goal  Goal: *ACEI prescribed if LVEF less than 40% and no contraindications or ARB prescribed  Outcome: Progressing Towards Goal  Goal: *Verbalizes understanding and describes prescribed diet  Outcome: Progressing Towards Goal  Goal: *Verbalizes understanding/describes prescribed medications  Outcome: Progressing Towards Goal  Goal: *Describes available resources and support systems  Description  (eg: Home Health, Palliative Care, Advanced Medical Directive)  Outcome: Progressing Towards Goal  Goal: *Describes smoking cessation resources  Outcome: Progressing Towards Goal  Goal: *Understands and describes signs and symptoms to report to providers(Stroke Metric)  Outcome: Progressing Towards Goal  Goal: *Describes/verbalizes understanding of follow-up/return appt  Description  (eg: to physicians, diabetes treatment coordinator, and other resources  Outcome: Progressing Towards Goal  Goal: *Describes importance of continuing daily weights and changes to report to physician  Outcome: Progressing Towards Goal     Problem: Pressure Injury - Risk of  Goal: *Prevention of pressure injury  Description  Document Mitchell Scale and appropriate interventions in the flowsheet. Outcome: Progressing Towards Goal  Note: Pressure Injury Interventions:  Sensory Interventions: Keep linens dry and wrinkle-free, Maintain/enhance activity level, Minimize linen layers, Assess changes in LOC, Turn and reposition approx. every two hours (pillows and wedges if needed)         Activity Interventions: Increase time out of bed    Mobility Interventions: HOB 30 degrees or less, Pressure redistribution bed/mattress (bed type), Turn and reposition approx.  every two hours(pillow and wedges)    Nutrition Interventions: Document food/fluid/supplement intake    Friction and Shear Interventions: Lift team/patient mobility team, Minimize layers, Lift sheet, Apply protective barrier, creams and emollients                Problem: Patient Education: Go to Patient Education Activity  Goal: Patient/Family Education  Outcome: Progressing Towards Goal

## 2020-02-11 NOTE — PROGRESS NOTES
1930 Bedside and Verbal shift change report given to Glory RN (oncoming nurse) by Monet Delcid RN (offgoing nurse). Report included the following information SBAR.     2200 CHG bath and bed change provided. Tolerated well. 0200 Labs drawn    0730 Bedside and Verbal shift change report given to [address] and Gigi RN (oncoming nurse) by Glory RN (offgoing nurse). Report included the following information SBAR.

## 2020-02-11 NOTE — PROGRESS NOTES
Transitions of Care Plan:     Patient s/p pericardial effusion; progressing medically     Anticipate discharge tomorrow; home with family assistance    Reason for Admission:   Pericardial Effusion                  RUR Score:     16             Do you (patient/family) have any concerns for transition/discharge? Patient states no concerns for discharge              Plan for utilizing home health:   Previously open with [facility]; no indication of need during assessment    Current Advanced Directive/Advance Care Plan:  Not on file. Transition of Care Plan:          CM met with patient at bedside to introduce self and discuss role. Patient states the following for initial assessment:    1. ADLs, self-care, orientation baseline - Independent. Alert and oriented. 2. Social - Resides at address on file alone. Daughter and son-in-law live nearby her home in [place]. Another daughter and son-in-law reside in [place]. Room air. Independent. 3.  [facility] in [place] (near [place]). 4.  Disposition - Home with family assistance. Patient may stay in the [place] area with her daughter nearby then will transport to [facility] with family assistance. CM will continue to follow. Iris Peña MPH    Care Management Interventions  PCP Verified by CM: Yes  Palliative Care Criteria Met (RRAT>21 & CHF Dx)?: No  Mode of Transport at Discharge:  Other (see comment)(Family, private car)  Transition of Care Consult (CM Consult): Discharge Planning  MyChart Signup: No  Discharge Durable Medical Equipment: No  Health Maintenance Reviewed: Yes  Physical Therapy Consult: No  Occupational Therapy Consult: No  Speech Therapy Consult: No  Current Support Network: Lives Alone  Confirm Follow Up Transport: Family  Discharge Location  Discharge Placement: Home with family assistance

## 2020-02-12 VITALS
HEART RATE: 76 BPM | HEIGHT: 65 IN | DIASTOLIC BLOOD PRESSURE: 76 MMHG | RESPIRATION RATE: 16 BRPM | TEMPERATURE: 98 F | OXYGEN SATURATION: 96 % | BODY MASS INDEX: 25.49 KG/M2 | WEIGHT: 153 LBS | SYSTOLIC BLOOD PRESSURE: 138 MMHG

## 2020-02-12 LAB
ALBUMIN SERPL-MCNC: 2.7 G/DL (ref 3.5–5)
ALBUMIN/GLOB SERPL: 0.8 {RATIO} (ref 1.1–2.2)
ALP SERPL-CCNC: 176 U/L (ref 45–117)
ALT SERPL-CCNC: 210 U/L (ref 12–78)
ANION GAP SERPL CALC-SCNC: 5 MMOL/L (ref 5–15)
AST SERPL-CCNC: 49 U/L (ref 15–37)
BASOPHILS # BLD: 0.1 K/UL (ref 0–0.1)
BASOPHILS NFR BLD: 1 % (ref 0–1)
BILIRUB SERPL-MCNC: 0.4 MG/DL (ref 0.2–1)
BUN SERPL-MCNC: 13 MG/DL (ref 6–20)
BUN/CREAT SERPL: 26 (ref 12–20)
CALCIUM SERPL-MCNC: 9.2 MG/DL (ref 8.5–10.1)
CHLORIDE SERPL-SCNC: 104 MMOL/L (ref 97–108)
CO2 SERPL-SCNC: 26 MMOL/L (ref 21–32)
CREAT SERPL-MCNC: 0.5 MG/DL (ref 0.55–1.02)
DIFFERENTIAL METHOD BLD: ABNORMAL
EOSINOPHIL # BLD: 0.2 K/UL (ref 0–0.4)
EOSINOPHIL NFR BLD: 3 % (ref 0–7)
ERYTHROCYTE [DISTWIDTH] IN BLOOD BY AUTOMATED COUNT: 19.4 % (ref 11.5–14.5)
GLOBULIN SER CALC-MCNC: 3.2 G/DL (ref 2–4)
GLUCOSE BLD STRIP.AUTO-MCNC: 107 MG/DL (ref 65–100)
GLUCOSE BLD STRIP.AUTO-MCNC: 130 MG/DL (ref 65–100)
GLUCOSE SERPL-MCNC: 116 MG/DL (ref 65–100)
HCT VFR BLD AUTO: 30.2 % (ref 35–47)
HGB BLD-MCNC: 9.7 G/DL (ref 11.5–16)
IMM GRANULOCYTES # BLD AUTO: 0.1 K/UL (ref 0–0.04)
IMM GRANULOCYTES NFR BLD AUTO: 2 % (ref 0–0.5)
LYMPHOCYTES # BLD: 1.5 K/UL (ref 0.8–3.5)
LYMPHOCYTES NFR BLD: 24 % (ref 12–49)
MCH RBC QN AUTO: 28.7 PG (ref 26–34)
MCHC RBC AUTO-ENTMCNC: 32.1 G/DL (ref 30–36.5)
MCV RBC AUTO: 89.3 FL (ref 80–99)
MONOCYTES # BLD: 0.5 K/UL (ref 0–1)
MONOCYTES NFR BLD: 8 % (ref 5–13)
NEUTS SEG # BLD: 3.9 K/UL (ref 1.8–8)
NEUTS SEG NFR BLD: 62 % (ref 32–75)
NRBC # BLD: 0 K/UL (ref 0–0.01)
NRBC BLD-RTO: 0 PER 100 WBC
PLATELET # BLD AUTO: 205 K/UL (ref 150–400)
PMV BLD AUTO: 10 FL (ref 8.9–12.9)
POTASSIUM SERPL-SCNC: 4.4 MMOL/L (ref 3.5–5.1)
PROT SERPL-MCNC: 5.9 G/DL (ref 6.4–8.2)
RBC # BLD AUTO: 3.38 M/UL (ref 3.8–5.2)
SERVICE CMNT-IMP: ABNORMAL
SERVICE CMNT-IMP: ABNORMAL
SODIUM SERPL-SCNC: 135 MMOL/L (ref 136–145)
WBC # BLD AUTO: 6.2 K/UL (ref 3.6–11)

## 2020-02-12 PROCEDURE — 74011250637 HC RX REV CODE- 250/637: Performed by: HOSPITALIST

## 2020-02-12 PROCEDURE — 36415 COLL VENOUS BLD VENIPUNCTURE: CPT

## 2020-02-12 PROCEDURE — 85025 COMPLETE CBC W/AUTO DIFF WBC: CPT

## 2020-02-12 PROCEDURE — 80053 COMPREHEN METABOLIC PANEL: CPT

## 2020-02-12 PROCEDURE — 82962 GLUCOSE BLOOD TEST: CPT

## 2020-02-12 PROCEDURE — 74011250637 HC RX REV CODE- 250/637: Performed by: INTERNAL MEDICINE

## 2020-02-12 RX ORDER — IBUPROFEN 400 MG/1
400 TABLET ORAL 3 TIMES DAILY
Qty: 15 TAB | Refills: 0 | Status: SHIPPED | OUTPATIENT
Start: 2020-02-12 | End: 2020-02-12

## 2020-02-12 RX ORDER — IBUPROFEN 400 MG/1
400 TABLET ORAL 3 TIMES DAILY
Qty: 45 TAB | Refills: 0 | Status: SHIPPED | OUTPATIENT
Start: 2020-02-12

## 2020-02-12 RX ORDER — CARVEDILOL 6.25 MG/1
6.25 TABLET ORAL 2 TIMES DAILY WITH MEALS
Qty: 60 TAB | Refills: 0 | Status: SHIPPED | OUTPATIENT
Start: 2020-02-12

## 2020-02-12 RX ADMIN — CARVEDILOL 6.25 MG: 6.25 TABLET, FILM COATED ORAL at 08:42

## 2020-02-12 RX ADMIN — LEVOTHYROXINE SODIUM 100 MCG: 100 TABLET ORAL at 06:38

## 2020-02-12 RX ADMIN — IBUPROFEN 400 MG: 400 TABLET, FILM COATED ORAL at 08:42

## 2020-02-12 RX ADMIN — PANTOPRAZOLE SODIUM 40 MG: 40 TABLET, DELAYED RELEASE ORAL at 06:38

## 2020-02-12 RX ADMIN — Medication 10 ML: at 06:38

## 2020-02-12 NOTE — DISCHARGE SUMMARY
Discharge Summary       PATIENT ID: Amy Russell  MRN: [id number]   YOB: 1940    DATE OF ADMISSION: 2/8/2020 10:28 PM    DATE OF DISCHARGE: 2/12/2020   PRIMARY CARE PROVIDER: Brandon Cobos MD     ATTENDING PHYSICIAN: Lester Bullock  DISCHARGING PROVIDER: Madison Cardona MD    To contact this individual call [phone] and ask the  to page. If unavailable ask to be transferred the Adult Hospitalist Department. CONSULTATIONS: IP CONSULT TO INTENSIVIST  IP CONSULT TO NEPHROLOGY  IP CONSULT TO CARDIOLOGY    PROCEDURES/SURGERIES: Procedure(s):  Pericardiocentesis    ADMITTING DIAGNOSES & HOSPITAL COURSE:     HPI    The patient presented to the ED with a 5 day history of progressive worsening of multiple complaints to include bilateral shoulder pain, chest pain, back pain, chills, weakness, vomiting and diarrhea. She was reportedly seen in the ED on 2-5-2020 and sent home. She was seen in our ED this evening and underwent lab studies and radiographic studies which revealed a large pericardial effusion. Further history revealed that she had a pacemaker placed in December 2019. Hospital course    1. Pericardial effusion  Patient presents with large pericardial effusion, admitted to ICU. Status post drainage of the pericardial fluid and the drain was left in place until output has receded. Patient with subsequent removal of the drain and effusion has improved. Unclear etiology of the pericardial effusion, but per cardiology, ablation is more likely because of the effusion than the pacemaker lead. Also patient was started on anti-inflammatory for possible pericarditis. The pericardial fluid culture shows no growth. Patient was on anticoagulation with Xarelto prior to presentation and it was discontinued due to requiring pericardiocentesis. We will continue to hold Xarelto upon discharge and cardiology to reevaluate the patient for recommendation of timing for restarting Xarelto.   Also patient to follow-up with cardiology to determine the duration of anti-inflammatory needed. 2.  Hyponatremia  Secondary to volume depletion. TSH and random cortisol were normal.  Sodium level has improved. 3.  Hypertension  Patient's diltiazem and amlodipine were discontinued. Started on Coreg with stable blood pressure. Patient to follow-up with PCP for reevaluation. 4.  Atrial fibrillation  Status post ablation with subsequent biventricular pacemaker placement. As noted above Xarelto will be held upon discharge due to post pericardiocentesis. Cardiology to recommend the timing for restarting the Xarelto. DISCHARGE DIAGNOSES / PLAN:      1. Pericardial effusion  2. Hyponatremia  3. Hypertension  4. Atrial fibrillation  5. Cardiomyopathy  6. Hypertension  7. Diabetes  8. Hypothyroidism  9. Renal insufficiency     ADDITIONAL CARE RECOMMENDATIONS:     None     PENDING TEST RESULTS:   At the time of discharge the following test results are still pending: None    FOLLOW UP APPOINTMENTS:    Follow-up Information     Follow up With Specialties Details Why Lexus Brantley MD Cardiology On 2/14/2020 Limited echo at 9;30AM and Dr. Marylen Gale at . [address] [place] [address]      Garrett Grady MD Internal Medicine   [address] [place] [id number]  [phone]               DIET: Diabetic Diet  Oral Nutritional Supplements: No Oral Supplement prescribed    ACTIVITY: Activity as tolerated    WOUND CARE: None    EQUIPMENT needed: None      DISCHARGE MEDICATIONS:  Current Discharge Medication List      START taking these medications    Details   carvediloL (COREG) 6.25 mg tablet Take 1 Tab by mouth two (2) times daily (with meals). Qty: 60 Tab, Refills: 0      ibuprofen (MOTRIN) 400 mg tablet Take 1 Tab by mouth three (3) times daily.   Qty: 15 Tab, Refills: 0         CONTINUE these medications which have NOT CHANGED    Details   pantoprazole (PROTONIX) 20 mg tablet       digestive enzymes cap Take  by mouth.      mecobalamin, vitamin B12, 1,000 mcg TbDi Place under the tongue. levothyroxine (SYNTHROID) 100 mcg tablet Take 100 mcg by mouth Daily (before breakfast). VIT C/E/ZN/COPPR/LUTEIN/ZEAXAN (PRESERVISION AREDS 2 PO) Take  by mouth. cholecalciferol (VITAMIN D3) 1,000 unit cap Take 1,000 Units by mouth daily. glucosamine (GLUCOSAMINE RELIEF) 1,000 mg tab Take  by mouth. WITH CHONDROINTIN 1500MG DAILY      magnesium 250 mg tab Take  by mouth. dietary supplement cap Take  by mouth. CALCIUM-MAGNESIUM PO Take 600 mg by mouth. STOP taking these medications       XARELTO 20 mg tab tablet Comments:   Reason for Stopping:         amLODIPine (NORVASC) 2.5 mg tablet Comments:   Reason for Stopping:         dilTIAZem CD (CARTIA XT) 120 mg ER capsule Comments:   Reason for Stopping:                 NOTIFY YOUR PHYSICIAN FOR ANY OF THE FOLLOWING:   Fever over 101 degrees for 24 hours. Chest pain, shortness of breath, fever, chills, nausea, vomiting, diarrhea, change in mentation, falling, weakness, bleeding. Severe pain or pain not relieved by medications. Or, any other signs or symptoms that you may have questions about. DISPOSITION:    Home With:   OT  PT  HH  RN       Long term SNF/Inpatient Rehab    Independent/assisted living    Hospice    Other:       PATIENT CONDITION AT DISCHARGE:     Functional status    Poor     Deconditioned     Independent      Cognition     Lucid     Forgetful     Dementia      Catheters/lines (plus indication)    Craig     PICC     PEG     None      Code status     Full code     DNR      PHYSICAL EXAMINATION AT DISCHARGE:  General:          Alert, cooperative, no distress, appears stated age.      HEENT:           Atraumatic, anicteric sclerae, pink conjunctivae                          No oral ulcers, mucosa moist, throat clear, dentition fair  Neck:               Supple, symmetrical  Lungs: Clear to auscultation bilaterally. No Wheezing or Rhonchi. No rales. Chest wall:      No tenderness  No Accessory muscle use. Heart:              Regular  rhythm,  No  murmur   No edema  Abdomen:        Soft, non-tender. Not distended. Bowel sounds normal  Extremities:     No cyanosis. No clubbing,                            Skin turgor normal, Capillary refill normal  Skin:                Not pale. Not Jaundiced  No rashes   Psych:             Not anxious or agitated.   Neurologic:      Alert, moves all extremities, answers questions appropriately and responds to commands       CHRONIC MEDICAL DIAGNOSES:  Problem List as of 2/12/2020 Date Reviewed: 1/10/2019          Codes Class Noted - Resolved    Pericardial effusion ICD-10-CM: I31.3  ICD-9-CM: 423.9  2/9/2020 - Present        A-fib ([facility] ICD-10-CM: I48.91  ICD-9-CM: 427.31  Unknown - Present        Acquired hypothyroidism ICD-10-CM: E03.9  ICD-9-CM: 244.9  7/12/2017 - Present        Controlled type 2 diabetes mellitus without complication ([facility] IIN-59-QA: E11.9  ICD-9-CM: 250.00  7/12/2017 - Present        Neck pain on right side ICD-10-CM: M54.2  ICD-9-CM: 723.1  1/3/2014 - Present        Polymyalgia rheumatica ([facility] ICD-10-CM: M35.3  ICD-9-CM: 323  1/3/2014 - Present        Thyroid nodule ICD-10-CM: E04.1  ICD-9-CM: 241.0  1/3/2014 - Present        Hypertension ICD-10-CM: I10  ICD-9-CM: 401.9  8/14/2009 - Present    Overview Signed 7/12/2017  8:40 AM by Angeline MAXWELL     Overview:                    Greater than 60 minutes were spent with the patient on counseling and coordination of care    Signed:   Portia Wilder MD  2/12/2020  12:02 PM

## 2020-02-12 NOTE — PROGRESS NOTES
Cardiology Consult/Progress Note         Subjective: Feels much better. Not excited about taking additional BP meds. Drain removed. Limited echo with no significant reaccumulation today    Investigation  Telemetry: paced rhythm  ECG: paced phythm  Echocardiogram: bedside echo with large pericardial effusion, paritial RC collapse. Echo 2/10/2010: Near complete resolution of effusion. LFTs improving likely related to passive congestion     Assessment and PLAN   1. History of recurrent paroxysmal A. Fib s/p AV roseanne ablation  2. BiV pacemaker placement for nonischemic cardiomyopathy in December 2019  3. History of ? nonischemic cardiomyopathy EF 50-55% on current echo  4. Likely recent viral illness causing symptoms of nausea vomiting lethargy and hypothermia  5. Large pericardial effusion likely related to #4 with signs of impending pericardial tamponade s/p pericardiocentesis     Mrs. Hemant Weinstein has improved dramatically after pericardiocentesis. We will try to treat her as pericarditis with initiation of nonsteroidal anti-inflammatory agents. No colchicine due to GI intolerance. Anant Nascimento Uncertain why she had pericarditis. Could be viral. Slight concern on CT that RV lead apically dsplaced but lack of reaccumulatation suggest against persistent slow leak. Patient does not want Losartan. Will d/c on Coreg alone. discharge today. Limited echo without significant re-accumulation. Plan for follow up echo on Friday and follow up with  the same day. []    High complexity decision making was performed  []    Patient is at high-risk of decompensation with multiple organ involvement      2/8/2020 10:28 PM   Pericardial effusion [I31.3]   HPI: Vinay Reyna is a 78 y.o. female admitted for progressive lethargy weakness and Pericardial effusion [I31.3]. Over the past 2 weeks she has reported progressive lethargy weakness poor appetite nausea vomiting and febrile feeling.   She also had a near syncopal spell about 5 days ago which was managed with IV hydration. Her oral intake has been poor for the last few days. In the last 3 days he also noted increasing tightness in the upper chest with pain on deep breathing. Over the past 24 hours she was singly lethargic poorly responsive and not able to keep anything down and was brought into the emergency room. Further evaluation identified signs of hypoperfusion, hypotension increased lactate. Chest x-ray demonstrated increased cardiac silhouette and chest CT demonstrated large pericardial effusion with small RV dimensions. Review of Symptoms:  Respiratory: No exertional dyspnea, orthopnea, PND, cough, hemoptysis, URI. Cardiovascular: No CP, palpitations, sweating, lightheadedness, dizziness, syncope, presyncope, lower extremity swelling. Otherwise no other pertinent positive or negative symptoms on ROS.      Patient Active Problem List    Diagnosis Date Noted    Pericardial effusion 02/09/2020    Acquired hypothyroidism 07/12/2017    Controlled type 2 diabetes mellitus without complication ([address] 55/05/4980    A-fib ([address]     Neck pain on right side 01/03/2014    Polymyalgia rheumatica ([address] 01/03/2014    Thyroid nodule 01/03/2014    Hypertension 08/14/2009      Hugo Monteiro MD  Past Medical History:   Diagnosis Date    A-fib [facility])     Arthritis     OA    Diabetes ([address]     PRE DIABETIC    GERD (gastroesophageal reflux disease)     Hypertension     Ill-defined condition     MACULAR DEGENERATION    Polymyalgia ([address]     Thyroid disease       Past Surgical History:   Procedure Laterality Date    HX APPENDECTOMY      HX BLEPHAROPLASTY      HX CATARACT REMOVAL Bilateral     HX DILATION AND CURETTAGE      HX ORTHOPAEDIC      RIGHT AND LEFT ARTHROSCOPIC SURGERY    HX ORTHOPAEDIC      RIGHT AND LEFT KNEE PARTIAL REPLACEMENTS JOINTS     Social History     Socioeconomic History    Marital status:      Spouse name: Not on file    Number of children: Not on file    Years of education: Not on file    Highest education level: Not on file   Tobacco Use    Smoking status: Former Smoker    Smokeless tobacco: Never Used   Substance and Sexual Activity    Alcohol use: Yes     Alcohol/week: 1.0 standard drinks     Types: 1 Glasses of wine per week     Comment: RARE    Drug use: No     Family History   Problem Relation Age of Onset    Stroke Mother     Diabetes Mother     Migraines Mother     Cancer Father         PROSTATE CANCER AND COLON CANCER    Liver Disease Father         HAD BLOOD TRANSFUSION,  OF CIRRHOSIS OF LIVER, WAS NON DRINKER    Diabetes Brother     Anesth Problems Neg Hx       Current Facility-Administered Medications   Medication Dose Route Frequency    ibuprofen (MOTRIN) tablet 400 mg  400 mg Oral TID    levothyroxine (SYNTHROID) tablet 100 mcg  100 mcg Oral 6am    pantoprazole (PROTONIX) tablet 40 mg  40 mg Oral ACB    sodium chloride (NS) flush 5-40 mL  5-40 mL IntraVENous Q8H    sodium chloride (NS) flush 5-40 mL  5-40 mL IntraVENous PRN    acetaminophen (TYLENOL) tablet 650 mg  650 mg Oral Q4H PRN    ondansetron (ZOFRAN) injection 4 mg  4 mg IntraVENous Q4H PRN    magnesium hydroxide (MILK OF MAGNESIA) 400 mg/5 mL oral suspension 30 mL  30 mL Oral DAILY PRN    carvediloL (COREG) tablet 6.25 mg  6.25 mg Oral BID WITH MEALS    glucose chewable tablet 16 g  4 Tab Oral PRN    glucagon (GLUCAGEN) injection 1 mg  1 mg IntraMUSCular PRN    dextrose 10% infusion 0-250 mL  0-250 mL IntraVENous PRN    sodium chloride (NS) flush 5-10 mL  5-10 mL IntraVENous PRN      Prior to Admission Medications   Prescriptions Last Dose Informant Patient Reported? Taking? CALCIUM-MAGNESIUM PO   Yes No   Sig: Take 600 mg by mouth. VIT C/E/ZN/COPPR/LUTEIN/ZEAXAN (PRESERVISION AREDS 2 PO)   Yes No   Sig: Take  by mouth.    XARELTO 20 mg tab tablet   No No   Sig: TAKE ONE TABLET BY MOUTH DAILY   amLODIPine (NORVASC) 2.5 mg tablet   Yes No   Sig: Take 2.5 mg by mouth. cholecalciferol (VITAMIN D3) 1,000 unit cap   Yes No   Sig: Take 1,000 Units by mouth daily. dietary supplement cap   Yes No   Sig: Take  by mouth. digestive enzymes cap   Yes No   Sig: Take  by mouth. dilTIAZem CD (CARTIA XT) 120 mg ER capsule   Yes No   Sig: Take  by mouth daily. glucosamine (GLUCOSAMINE RELIEF) 1,000 mg tab   Yes No   Sig: Take  by mouth. WITH CHONDROINTIN 1500MG DAILY   levothyroxine (SYNTHROID) 100 mcg tablet   Yes No   Sig: Take 100 mcg by mouth Daily (before breakfast). magnesium 250 mg tab   Yes No   Sig: Take  by mouth.   mecobalamin, vitamin B12, 1,000 mcg TbDi   Yes No   Sig: Place under the tongue.    pantoprazole (PROTONIX) 20 mg tablet   Yes No      Facility-Administered Medications: None      Allergies   Allergen Reactions    Ciprofloxacin Myalgia    Quinolones Other (comments)     EYES CAN'T FOCUS, PATIENT CANT WALK        Labs:   Recent Results (from the past 24 hour(s))   GLUCOSE, POC    Collection Time: 02/11/20 12:23 PM   Result Value Ref Range    Glucose (POC) 170 (H) 65 - 100 mg/dL    Performed by Sherly Quinonez    GLUCOSE, POC    Collection Time: 02/11/20  4:33 PM   Result Value Ref Range    Glucose (POC) 145 (H) 65 - 100 mg/dL    Performed by Sherly Quinonez    GLUCOSE, POC    Collection Time: 02/11/20  9:20 PM   Result Value Ref Range    Glucose (POC) 121 (H) 65 - 100 mg/dL    Performed by Priscila SANDOVAL (CON)    CBC WITH AUTOMATED DIFF    Collection Time: 02/12/20  4:54 AM   Result Value Ref Range    WBC 6.2 3.6 - 11.0 K/uL    RBC 3.38 (L) 3.80 - 5.20 M/uL    HGB 9.7 (L) 11.5 - 16.0 g/dL    HCT 30.2 (L) 35.0 - 47.0 %    MCV 89.3 80.0 - 99.0 FL    MCH 28.7 26.0 - 34.0 PG    MCHC 32.1 30.0 - 36.5 g/dL    RDW 19.4 (H) 11.5 - 14.5 %    PLATELET 448 584 - 272 K/uL    MPV 10.0 8.9 - 12.9 FL    NRBC 0.0 0  WBC    ABSOLUTE NRBC 0.00 0.00 - 0.01 K/uL    NEUTROPHILS 62 32 - 75 %    LYMPHOCYTES 24 12 - 49 %    MONOCYTES 8 5 - 13 %    EOSINOPHILS 3 0 - 7 %    BASOPHILS 1 0 - 1 %    IMMATURE GRANULOCYTES 2 (H) 0.0 - 0.5 %    ABS. NEUTROPHILS 3.9 1.8 - 8.0 K/UL    ABS. LYMPHOCYTES 1.5 0.8 - 3.5 K/UL    ABS. MONOCYTES 0.5 0.0 - 1.0 K/UL    ABS. EOSINOPHILS 0.2 0.0 - 0.4 K/UL    ABS. BASOPHILS 0.1 0.0 - 0.1 K/UL    ABS. IMM. GRANS. 0.1 (H) 0.00 - 0.04 K/UL    DF AUTOMATED     METABOLIC PANEL, COMPREHENSIVE    Collection Time: 02/12/20  4:54 AM   Result Value Ref Range    Sodium 135 (L) 136 - 145 mmol/L    Potassium 4.4 3.5 - 5.1 mmol/L    Chloride 104 97 - 108 mmol/L    CO2 26 21 - 32 mmol/L    Anion gap 5 5 - 15 mmol/L    Glucose 116 (H) 65 - 100 mg/dL    BUN 13 6 - 20 MG/DL    Creatinine 0.50 (L) 0.55 - 1.02 MG/DL    BUN/Creatinine ratio 26 (H) 12 - 20      GFR est AA >60 >60 ml/min/1.73m2    GFR est non-AA >60 >60 ml/min/1.73m2    Calcium 9.2 8.5 - 10.1 MG/DL    Bilirubin, total 0.4 0.2 - 1.0 MG/DL    ALT (SGPT) 210 (H) 12 - 78 U/L    AST (SGOT) 49 (H) 15 - 37 U/L    Alk.  phosphatase 176 (H) 45 - 117 U/L    Protein, total 5.9 (L) 6.4 - 8.2 g/dL    Albumin 2.7 (L) 3.5 - 5.0 g/dL    Globulin 3.2 2.0 - 4.0 g/dL    A-G Ratio 0.8 (L) 1.1 - 2.2     GLUCOSE, POC    Collection Time: 02/12/20  6:20 AM   Result Value Ref Range    Glucose (POC) 130 (H) 65 - 100 mg/dL    Performed by Loy Flroes H (CON)        Intake/Output Summary (Last 24 hours) at 2/12/2020 1015  Last data filed at 2/12/2020 0512  Gross per 24 hour   Intake 560 ml   Output 1900 ml   Net -1340 ml      Patient Vitals for the past 24 hrs:   Temp Pulse Resp BP SpO2   02/12/20 0747 97.5 °F (36.4 °C) 79 18 (!) 147/93 97 %   02/12/20 0306 97.3 °F (36.3 °C) 77 17 152/86 99 %   02/11/20 2316 97.8 °F (36.6 °C) 84 20 142/82 100 %   02/11/20 2101 97.6 °F (36.4 °C) 81 18 149/81 99 %   02/11/20 2000 97.9 °F (36.6 °C) 76 17 (!) 137/94 98 %   02/11/20 1900 -- 78 19 125/71 --   02/11/20 1800 -- 76 22 139/82 97 %   02/11/20 1700 -- 75 21 139/88 97 %   02/11/20 1600 97.6 °F (36.4 °C) 75 17 118/73 97 %   02/11/20 1500 -- 76 21 134/70 --   02/11/20 1400 -- 75 18 121/73 --   02/11/20 1300 -- 76 24 (!) 151/92 94 %   02/11/20 1200 97.8 °F (36.6 °C) 75 17 142/90 --   02/11/20 1100 -- 75 16 119/75 --      General:    Pale, drowsy and in distress. Psychiatric:    Normal Mood and affect    Eye/ENT:      Pupils equal, No asymmetry, Conjunctival pink. Able to hear voice at normal amplitude   Lungs:      Visibly symmetric chest expansion, No palpable tenderness. Clear to auscultation bilaterally. Heart[de-identified]    Regular rate and rhythm, S1, S2 normal, no murmur, click, rub or gallop. No JVD, Normal palpable peripheral pulses. No cyanosis. Significantly engorged neck veins   Abdomen:     Soft, non-tender. Bowel sounds normal. No masses,  No      organomegaly. Extremities:   Extremities normal, atraumatic, no edema. Neurologic:   CN II-XII grossly intact.  No gross focal deficits         Cindy Jones MD  2/12/2020   4:18 AM     [facility] Office:   [facility] Office:  [address] [place] [address]  P: [phone]    P: [phone]  F: [phone]    F: [phone]

## 2020-02-12 NOTE — PROGRESS NOTES
2050 TRANSFER - IN REPORT:    Verbal report received from [place], RN(name) on Shiela Sterling  being received from CCU(unit) for routine progression of care      Report consisted of patients Situation, Background, Assessment and   Recommendations(SBAR). Information from the following report(s) SBAR, Kardex, Intake/Output, MAR, Recent Results and Cardiac Rhythm Paced was reviewed with the receiving nurse. Opportunity for questions and clarification was provided. Assessment completed upon patients arrival to unit and care assumed. 2100 Pt up on unit telemetry. VSS. Dual skin WDL completed with CAMELIA Stewart RN. Assessment completed upon arrival.     0800 Bedside shift change report given to David Deleon (oncoming nurse) by Cedrick Shah RN (offgoing nurse). Report included the following information SBAR, Kardex, Intake/Output, MAR and Recent Results.

## 2020-02-12 NOTE — PROGRESS NOTES
TRANSFER - OUT REPORT:    Verbal report given to Suzanne Ramirez (name) on Ivy Parmar  being transferred to CCU(unit) for routine progression of care       Report consisted of patients Situation, Background, Assessment and   Recommendations(SBAR). Information from the following report(s) SBAR, Kardex, Procedure Summary, Intake/Output, Recent Results, Med Rec Status, Cardiac Rhythm Paced, Alarm Parameters  and Quality Measures was reviewed with the receiving nurse. Lines:   Peripheral IV 02/08/20 Left Antecubital (Active)   Phlebitis Assessment 0 2/11/2020 12:00 PM   Infiltration Assessment 0 2/11/2020 12:00 PM   Dressing Status Clean, dry, & intact 2/11/2020 12:00 PM   Dressing Type Transparent 2/11/2020 12:00 PM   Hub Color/Line Status Green 2/11/2020 12:00 PM   Action Taken Open ports on tubing capped 2/11/2020 12:00 PM   Alcohol Cap Used Yes 2/11/2020 12:00 PM       Peripheral IV 02/08/20 Right Hand (Active)   Phlebitis Assessment 0 2/11/2020 12:00 PM   Infiltration Assessment 0 2/11/2020 12:00 PM   Dressing Status Clean, dry, & intact 2/11/2020 12:00 PM   Dressing Type Transparent 2/11/2020 12:00 PM   Hub Color/Line Status Green 2/11/2020 12:00 PM   Action Taken Open ports on tubing capped 2/11/2020 12:00 PM   Alcohol Cap Used Yes 2/11/2020 12:00 PM        Opportunity for questions and clarification was provided.       Patient transported with:   Monitor  Registered Nurse

## 2020-02-12 NOTE — PROGRESS NOTES
Problem: Pain - Acute  Goal: *Control of acute pain  Outcome: Progressing Towards Goal     Problem: Heart Failure: Discharge Outcomes  Goal: *Left ventricular function assessment completed prior to or during stay, or planned for post-discharge  Outcome: Progressing Towards Goal  Goal: *ACEI prescribed if LVEF less than 40% and no contraindications or ARB prescribed  Outcome: Progressing Towards Goal  Goal: *Verbalizes understanding and describes prescribed diet  Outcome: Progressing Towards Goal  Goal: *Describes smoking cessation resources  Outcome: Progressing Towards Goal     Problem: Pressure Injury - Risk of  Goal: *Prevention of pressure injury  Description  Document Mitchell Scale and appropriate interventions in the flowsheet. Outcome: Progressing Towards Goal  Note: Pressure Injury Interventions:  Sensory Interventions: Assess need for specialty bed, Avoid rigorous massage over bony prominences, Float heels, Minimize linen layers, Monitor skin under medical devices         Activity Interventions: Assess need for specialty bed, Increase time out of bed, Pressure redistribution bed/mattress(bed type)    Mobility Interventions: Assess need for specialty bed, Float heels, HOB 30 degrees or less, Pressure redistribution bed/mattress (bed type)    Nutrition Interventions: Document food/fluid/supplement intake, Discuss nutritional consult with provider    Friction and Shear Interventions: Apply protective barrier, creams and emollients, Foam dressings/transparent film/skin sealants, HOB 30 degrees or less, Lift sheet                Problem: Falls - Risk of  Goal: *Absence of Falls  Description  Document Marija Fall Risk and appropriate interventions in the flowsheet.   Outcome: Progressing Towards Goal  Note: Fall Risk Interventions:            Medication Interventions: Assess postural VS orthostatic hypotension, Evaluate medications/consider consulting pharmacy    Elimination Interventions: Call light in reach, Patient to call for help with toileting needs              Problem: Hypertension  Goal: *Blood pressure within specified parameters  Outcome: Progressing Towards Goal

## 2020-02-12 NOTE — PROGRESS NOTES
Transitions of Care Plan:     Patient medically stable for discharge today; home with family assistance     Patient's daughter to transport patient at time of discharge    Medicare pt has received, reviewed, and signed 2nd IM letter informing them of their right to appeal the discharge. Signed copied has been placed on pt bedside chart.     Trevor Nielsen MPH

## 2020-02-12 NOTE — PROGRESS NOTES
[place]  [facility] [place] : 2/8/2020    CC: Follow up for hyponatremia and hyperkalemia       Assessment and Plan     Hyponatremia and hyperkalemia:  - from volume depletion  - TSH, random cortisol ok  - resolved  - ok for d/c from renal perspective     Mild LAM form volume depletion, low C. O from pericardial effusion:  - resolved     Pericardial effusion a/w hypotension  -S/P pericardiocentesis 2/9/20     PAF, S/P ablation and subsequent BiV pacemaker     Non ischemic CM - most recent LVEF~45-50% per Cards     H/o PMR - no h/o SLE or other atuoimmune disease     DM2:  - on insulin     HTN:  - BP stable on coreg     Hypothyroid:  - TSH ok       Interval History:  Seen and examined. Na and K stable. Feeling well. For d/c home today. Current Medications: all current  Medications have been eviewed in EPIC  Review of Systems: Pertinent items are noted in HPI. Objective:  Vitals:    Vitals:    02/11/20 2316 02/12/20 0306 02/12/20 0639 02/12/20 0747   BP: 142/82 152/86  (!) 147/93   Pulse: 84 77  79   Resp: 20 17  18   Temp: 97.8 °F (36.6 °C) 97.3 °F (36.3 °C)  97.5 °F (36.4 °C)   SpO2: 100% 99%  97%   Weight:   69.4 kg (153 lb)    Height:         Intake and Output:  No intake/output data recorded.   02/10 1901 - 02/12 0700  In: 65 [P.O.:680]  Out: 3450 [Urine:3450]    Physical Examination:  Pt intubated     No  General: NAD,Conversant   Neck:  Supple, no mass  Resp:  Lungs CTA B/L, no wheezing , normal respiratory effort  CV:  RRR,  no murmur or rub, no LE edema  GI:  Soft, NT, + Bowel sounds, no hepatosplenomegaly  Neurologic:  Non focal  Psych:             AAO x 3 appropriate affect   Skin:  No Rash  :  No perez    []    High complexity decision making was performed  []    Patient is at high-risk of decompensation with multiple organ involvement    Lab Data Personally Reviewed: I have reviewed all the pertinent labs, microbiology data and radiology studies during assessment. Recent Labs     02/12/20  0454 02/11/20  0205 02/10/20  0300 02/09/20  1524   * 134* 131* 131*   K 4.4 4.7 4.3 4.4    104 102 103   CO2 26 26 24 22   * 122* 114* 105*   BUN 13 13 22* 28*   CREA 0.50* 0.50* 0.62 0.85   CA 9.2 8.9 8.8 8.2*   PHOS  --   --   --  3.2   ALB 2.7* 2.5* 2.6* 2.7*   SGOT 49* 94* 201*  --    * 285* 387*  --      Recent Labs     02/12/20  0454 02/11/20  0205 02/10/20  0300   WBC 6.2 8.5 9.6   HGB 9.7* 9.4* 9.3*   HCT 30.2* 29.0* 28.6*    182 183     No results found for: SDES  Lab Results   Component Value Date/Time    Culture result: Culture performed on Unspun Fluid 02/09/2020 04:39 AM    Culture result: NO GROWTH 2 DAYS 02/09/2020 04:39 AM    Culture result: No growth (<1,000 CFU/ML) 02/09/2020 12:49 AM     Recent Results (from the past 24 hour(s))   GLUCOSE, POC    Collection Time: 02/11/20 12:23 PM   Result Value Ref Range    Glucose (POC) 170 (H) 65 - 100 mg/dL    Performed by KATE BRUSH    GLUCOSE, POC    Collection Time: 02/11/20  4:33 PM   Result Value Ref Range    Glucose (POC) 145 (H) 65 - 100 mg/dL    Performed by Marcel Loza    GLUCOSE, POC    Collection Time: 02/11/20  9:20 PM   Result Value Ref Range    Glucose (POC) 121 (H) 65 - 100 mg/dL    Performed by London SANDOVAL (CON)    CBC WITH AUTOMATED DIFF    Collection Time: 02/12/20  4:54 AM   Result Value Ref Range    WBC 6.2 3.6 - 11.0 K/uL    RBC 3.38 (L) 3.80 - 5.20 M/uL    HGB 9.7 (L) 11.5 - 16.0 g/dL    HCT 30.2 (L) 35.0 - 47.0 %    MCV 89.3 80.0 - 99.0 FL    MCH 28.7 26.0 - 34.0 PG    MCHC 32.1 30.0 - 36.5 g/dL    RDW 19.4 (H) 11.5 - 14.5 %    PLATELET 764 047 - 423 K/uL    MPV 10.0 8.9 - 12.9 FL    NRBC 0.0 0  WBC    ABSOLUTE NRBC 0.00 0.00 - 0.01 K/uL    NEUTROPHILS 62 32 - 75 %    LYMPHOCYTES 24 12 - 49 %    MONOCYTES 8 5 - 13 %    EOSINOPHILS 3 0 - 7 %    BASOPHILS 1 0 - 1 %    IMMATURE GRANULOCYTES 2 (H) 0.0 - 0.5 %    ABS.  NEUTROPHILS 3.9 1.8 - 8.0 K/UL ABS. LYMPHOCYTES 1.5 0.8 - 3.5 K/UL    ABS. MONOCYTES 0.5 0.0 - 1.0 K/UL    ABS. EOSINOPHILS 0.2 0.0 - 0.4 K/UL    ABS. BASOPHILS 0.1 0.0 - 0.1 K/UL    ABS. IMM. GRANS. 0.1 (H) 0.00 - 0.04 K/UL    DF AUTOMATED     METABOLIC PANEL, COMPREHENSIVE    Collection Time: 02/12/20  4:54 AM   Result Value Ref Range    Sodium 135 (L) 136 - 145 mmol/L    Potassium 4.4 3.5 - 5.1 mmol/L    Chloride 104 97 - 108 mmol/L    CO2 26 21 - 32 mmol/L    Anion gap 5 5 - 15 mmol/L    Glucose 116 (H) 65 - 100 mg/dL    BUN 13 6 - 20 MG/DL    Creatinine 0.50 (L) 0.55 - 1.02 MG/DL    BUN/Creatinine ratio 26 (H) 12 - 20      GFR est AA >60 >60 ml/min/1.73m2    GFR est non-AA >60 >60 ml/min/1.73m2    Calcium 9.2 8.5 - 10.1 MG/DL    Bilirubin, total 0.4 0.2 - 1.0 MG/DL    ALT (SGPT) 210 (H) 12 - 78 U/L    AST (SGOT) 49 (H) 15 - 37 U/L    Alk. phosphatase 176 (H) 45 - 117 U/L    Protein, total 5.9 (L) 6.4 - 8.2 g/dL    Albumin 2.7 (L) 3.5 - 5.0 g/dL    Globulin 3.2 2.0 - 4.0 g/dL    A-G Ratio 0.8 (L) 1.1 - 2.2     GLUCOSE, POC    Collection Time: 02/12/20  6:20 AM   Result Value Ref Range    Glucose (POC) 130 (H) 65 - 100 mg/dL    Performed by Jonathon Maldonado (CON)                Rashmi Santos MD  [address]  Phone - [phone]   Fax - [phone]  [email or web address]

## 2020-02-12 NOTE — PROGRESS NOTES
0730: Bedside shift change report given to Jad Montes and [facility] (oncoming nurse) by [facility] (offgoing nurse). Report included the following information SBAR, Kardex, and Intake/Output. 1250: I have reviewed discharge instructions with the patient and caregiver. The patient and caregiver verbalized understanding. Discharge Medication List as of 2/12/2020 12:16 PM      START taking these medications    Details   carvediloL (COREG) 6.25 mg tablet Take 1 Tab by mouth two (2) times daily (with meals). , Normal, Disp-60 Tab, R-0         CONTINUE these medications which have CHANGED    Details   ibuprofen (MOTRIN) 400 mg tablet Take 1 Tab by mouth three (3) times daily. , Normal, Disp-45 Tab, R-0         CONTINUE these medications which have NOT CHANGED    Details   pantoprazole (PROTONIX) 20 mg tablet Historical Med      digestive enzymes cap Take  by mouth., Historical Med      mecobalamin, vitamin B12, 1,000 mcg TbDi Place under the tongue., Historical Med      levothyroxine (SYNTHROID) 100 mcg tablet Take 100 mcg by mouth Daily (before breakfast). , Historical Med      VIT C/E/ZN/COPPR/LUTEIN/ZEAXAN (PRESERVISION AREDS 2 PO) Take  by mouth., Historical Med      cholecalciferol (VITAMIN D3) 1,000 unit cap Take 1,000 Units by mouth daily. , Historical Med      glucosamine (GLUCOSAMINE RELIEF) 1,000 mg tab Take  by mouth.  WITH CHONDROINTIN 1500MG DAILY, Historical Med      magnesium 250 mg tab Take  by mouth., Historical Med      dietary supplement cap Take  by mouth., Historical Med      CALCIUM-MAGNESIUM PO Take 600 mg by mouth., Historical Med         STOP taking these medications       XARELTO 20 mg tab tablet Comments:   Reason for Stopping:         amLODIPine (NORVASC) 2.5 mg tablet Comments:   Reason for Stopping:         dilTIAZem CD (CARTIA XT) 120 mg ER capsule Comments:   Reason for Stopping:                 Problem: Heart Failure: Day 4  Goal: Activity/Safety  Outcome: Progressing Towards Goal Problem: Heart Failure: Day 4  Goal: Diagnostic Test/Procedures  Outcome: Progressing Towards Goal     Problem: Heart Failure: Day 4  Goal: Nutrition/Diet  Outcome: Progressing Towards Goal     Problem: Heart Failure: Day 4  Goal: Discharge Planning  Outcome: Progressing Towards Goal     Problem: Heart Failure: Day 4  Goal: Medications  Outcome: Progressing Towards Goal     Problem: Heart Failure: Day 4  Goal: Respiratory  Outcome: Progressing Towards Goal     Problem: Heart Failure: Day 4  Goal: Treatments/Interventions/Procedures  Outcome: Progressing Towards Goal     Problem: Heart Failure: Day 4  Goal: Psychosocial  Outcome: Progressing Towards Goal     Problem: Heart Failure: Day 4  Goal: *Oxygen saturation within defined limits  Outcome: Progressing Towards Goal     Problem: Heart Failure: Day 4  Goal: *Hemodynamically stable  Outcome: Progressing Towards Goal     Problem: Heart Failure: Day 4  Goal: *Optimal pain control at patient's stated goal  Outcome: Progressing Towards Goal     Problem: Heart Failure: Day 4  Goal: *Anxiety reduced or absent  Outcome: Progressing Towards Goal     Problem: Heart Failure: Day 4  Goal: *Demonstrates progressive activity  Outcome: Progressing Towards Goal     Problem: Pressure Injury - Risk of  Goal: *Prevention of pressure injury  Description  Document Mitchell Scale and appropriate interventions in the flowsheet.   Outcome: Progressing Towards Goal  Note: Pressure Injury Interventions:  Sensory Interventions: Assess changes in LOC, Chair cushion, Keep linens dry and wrinkle-free, Minimize linen layers         Activity Interventions: Assess need for specialty bed, Chair cushion    Mobility Interventions: Chair cushion, Pressure redistribution bed/mattress (bed type)    Nutrition Interventions: Document food/fluid/supplement intake    Friction and Shear Interventions: Lift sheet, HOB 30 degrees or less                Problem: Falls - Risk of  Goal: *Absence of Falls  Description  Document Oswald Estevez Fall Risk and appropriate interventions in the flowsheet. Outcome: Progressing Towards Goal  Note: Fall Risk Interventions:            Medication Interventions: Patient to call before getting OOB, Teach patient to arise slowly    Elimination Interventions: Call light in reach              Problem: Hypertension  Goal: *Blood pressure within specified parameters  Outcome: Progressing Towards Goal     Problem: Hypertension  Goal: *Fluid volume balance  Outcome: Progressing Towards Goal     Problem: Tissue Perfusion - Cardiopulmonary, Altered  Goal: *Optimize tissue perfusion  Outcome: Progressing Towards Goal     Problem: Tissue Perfusion - Cardiopulmonary, Altered  Goal: *Absence of hypoxia  Outcome: Progressing Towards Goal     Problem: Diabetes Self-Management  Goal: *Disease process and treatment process  Description  Define diabetes and identify own type of diabetes; list 3 options for treating diabetes. Outcome: Progressing Towards Goal     Problem: Diabetes Self-Management  Goal: *Incorporating nutritional management into lifestyle  Description  Describe effect of type, amount and timing of food on blood glucose; list 3 methods for planning meals. Outcome: Progressing Towards Goal     Problem: Diabetes Self-Management  Goal: *Developing strategies to promote health/change behavior  Description  Define the ABC's of diabetes; identify appropriate screenings, schedule and personal plan for screenings. Outcome: Progressing Towards Goal     Problem: Diabetes Self-Management  Goal: *Using medications safely  Description  State effect of diabetes medications on diabetes; name diabetes medication taking, action and side effects. Outcome: Progressing Towards Goal     Problem: Diabetes Self-Management  Goal: *Monitoring blood glucose, interpreting and using results  Description  Identify recommended blood glucose targets  and personal targets.   Outcome: Progressing Towards Goal

## 2020-02-12 NOTE — DISCHARGE INSTRUCTIONS
Patient Education        Learning About Pericardial Effusion  What is pericardial effusion? Pericardial effusion is a buildup of too much fluid in the sac around your heart. This sac is called the pericardium. Normally, there is a small amount of fluid between this sac and your heart. This fluid surrounds and helps cushion your heart. Extra fluid can be caused by many things, including pericarditis (inflammation of the sac), heart attack, surgery, kidney failure, infection, some cancers, and certain diseases such as lupus. Sometimes the cause is not known. What happens when you have pericardial effusion? How long it takes for the amount of fluid in your pericardium to get back to normal will depend on what caused the extra fluid and what treatment you have. If a lot of fluid builds up quickly, it can cause increased pressure on your heart. This pressure is called cardiac tamponade. It is an emergency that can reduce the heart's ability to pump blood. In some people, pericardial effusion comes back and must be treated again. What are the symptoms? Symptoms depend on how much fluid there is and how fast the fluid builds up. Symptoms may include:  · Chest pain. · Trouble breathing. Some people have no symptoms. How is it diagnosed? You will have an echocardiogram (\"echo\"). This is a test that lets your doctor see how much fluid is in your pericardium and how your heart is working. You also may have tests such as a chest X-ray, EKG, or CT scan. Your doctor may want to take a small sample of the fluid around your heart for testing. This may help find the cause of the extra fluid. How is it treated? If there is only a small amount of extra fluid in your pericardium, you may not need treatment. The extra fluid may go away on its own. Treatment depends on the cause of the extra fluid, the amount of fluid, and your symptoms.  Options include:  · Medicine to treat the cause of the effusion, if the cause is known.  · Pericardiocentesis. This is a procedure that uses a needle and a tube to drain the fluid. You will get a shot of anesthetic to numb the skin and deeper tissues. You will be awake for the procedure. · Surgery to open a section of the pericardium to drain the fluid. You will get anesthesia that makes you sleep during the surgery. Follow-up care is a key part of your treatment and safety. Be sure to make and go to all appointments, and call your doctor if you are having problems. It's also a good idea to know your test results and keep a list of the medicines you take. Where can you learn more? Go to http://[facility].info/. Enter V956 in the search box to learn more about \"Learning About Pericardial Effusion. \"  Current as of: April 9, 2019  Content Version: 12.2  © 8999-1193 [employer]. Care instructions adapted under license by [employer] (which disclaims liability or warranty for this information). If you have questions about a medical condition or this instruction, always ask your healthcare professional. [place] [address] any warranty or liability for your use of this information. Patient Education          6 Ways to Prevent Sepsis in the Hospital  Sepsis is a life-threatening reaction to an infection. Sepsis can develop very quickly. It can damage tissue and organs. Most of the time, sepsis is caused by a bacterial infection. An illness, an injury, or a reaction to surgery can also cause it. Here are some things you can do to avoid sepsis while you're in the hospital.   Pay attention to how you feel. Sepsis can cause breathing problems, a fast heartbeat, chills, cool and clammy skin, skin rashes, and shaking. Other symptoms may include fever, low body temperature, confusion, and low blood pressure. Sepsis often causes a combination of these symptoms. Tell someone on your care team if you are concerned about sepsis.   If you have some of these symptoms and think something is wrong, tell a doctor or nurse. Tell them \"I'm concerned about sepsis. \"    Help prevent infections. Wash your hands often while you are in the hospital. Keep any cuts, scrapes, and stitches clean. Your doctor or nurse will help with this. Ask family or friends NOT to visit you if they're sick. It's best to avoid people who have colds and flu while you're in the hospital.    Make sure you've gotten recommended vaccines. If you've had vaccines to prevent pneumonia, flu, and other infections in the past, ask your doctor if you need another dose. Do not smoke or use other tobacco products. When you quit smoking, you are less likely to get a cold, flu, or infection. Now can be a good time to think about quitting for good. If you need help quitting, talk to your doctor about stop-smoking programs and medicines. Current as of: December 13, 2018  Content Version: 12.2  © 8372-2671 [employer]. Care instructions adapted under license by [employer] (which disclaims liability or warranty for this information). If you have questions about a medical condition or this instruction, always ask your healthcare professional. [address] any warranty or liability for your use of this information.

## 2020-02-12 NOTE — PROGRESS NOTES
Hospital follow-up PCP transitional care appointment has been scheduled with Dr. Juli Herrera for Thursday, 2/20/20 at 11:00 a.m. Pending patient discharge.   Syeda Muse, Care Management Specialist.

## 2020-02-13 ENCOUNTER — OFFICE VISIT (OUTPATIENT)
Dept: CARDIOLOGY CLINIC | Age: 80
End: 2020-02-13

## 2020-02-13 ENCOUNTER — TELEPHONE (OUTPATIENT)
Dept: CARDIOLOGY CLINIC | Age: 80
End: 2020-02-13

## 2020-02-13 VITALS
DIASTOLIC BLOOD PRESSURE: 88 MMHG | HEART RATE: 84 BPM | SYSTOLIC BLOOD PRESSURE: 124 MMHG | RESPIRATION RATE: 14 BRPM | HEIGHT: 65 IN | BODY MASS INDEX: 25.33 KG/M2 | WEIGHT: 152 LBS

## 2020-02-13 DIAGNOSIS — M54.2 NECK PAIN: ICD-10-CM

## 2020-02-13 DIAGNOSIS — I97.190 COMPLETE AV BLOCK DUE TO AV NODAL ABLATION (HCC): ICD-10-CM

## 2020-02-13 DIAGNOSIS — I48.19 PERSISTENT ATRIAL FIBRILLATION (HCC): ICD-10-CM

## 2020-02-13 DIAGNOSIS — I31.39 PERICARDIAL EFFUSION: Primary | ICD-10-CM

## 2020-02-13 DIAGNOSIS — Z95.0 BIVENTRICULAR CARDIAC PACEMAKER IN SITU: ICD-10-CM

## 2020-02-13 DIAGNOSIS — I44.2 COMPLETE AV BLOCK DUE TO AV NODAL ABLATION (HCC): ICD-10-CM

## 2020-02-13 DIAGNOSIS — R06.09 DOE (DYSPNEA ON EXERTION): ICD-10-CM

## 2020-02-13 DIAGNOSIS — I10 ESSENTIAL HYPERTENSION: ICD-10-CM

## 2020-02-13 DIAGNOSIS — R60.0 BILATERAL LEG EDEMA: ICD-10-CM

## 2020-02-13 LAB
BACTERIA SPEC CULT: NORMAL
GRAM STN SPEC: NORMAL
GRAM STN SPEC: NORMAL
SERVICE CMNT-IMP: NORMAL
SERVICE CMNT-IMP: NORMAL

## 2020-02-13 RX ORDER — FUROSEMIDE 20 MG/1
TABLET ORAL
COMMUNITY

## 2020-02-13 RX ORDER — COLCHICINE 0.6 MG/1
0.6 CAPSULE ORAL DAILY
Qty: 30 CAP | Refills: 2 | Status: SHIPPED | OUTPATIENT
Start: 2020-02-13

## 2020-02-13 NOTE — TELEPHONE ENCOUNTER
Patients daughter Iis requesting to speak with Dr. Malvin Romero or Dr. Ledy Avila regarding some issues that her mother(the patient) is having. Please advise.      Phone: [phone]

## 2020-02-13 NOTE — PROGRESS NOTES
Cardiac Electrophysiology OFFICE Note Subjective:  
  
Tarun Cook is a 78 y.o. patient who is seen for follow up of pericardial effusion. Discharged yesterday after admission at [facility] for pericardial effusion & partial RV collapse. Required emergent pericardiocentesis on 02/12/2020. No growth to date on culture. Chest CT during admission showed possible RV lead perforation. Since discharge, she has used ibuprofen as advised. In the past day, has had recurrent onset of neck/shoulder/chest discomfort as well as lower extremity edema & bilateral lower extremity weakness. Pain is not worse with inspiration. She was instructed by Dr. Dejah Yun to take half dose diuretic, had good urine output but swelling persists. Preliminary review of echo today shows no pericardial effusion. 
  
Xarelto held during admission & since discharge. 
 
  
Previous: 
Echo (02/09/2020): LVEF 50-55%, mild concentric LVH. Mod dilated LA. Severely dilated RA. Trace MR. Trace AR. Trace TR. Mod pericardial effusion without indication of tamponade. Compared to pre pericardiocentesis, fluid significantly reduced & now not circumferential but limited to posterior of LV. Few strands of fibrin noted, loculation may not be completely excluded. Admitted 02/08/2020-02/12/2020 at [facility] with 2 week history of progressive lethargy, weakness, poor appetite, n/v, & febrile sensation, then increasing tightness in upper chest with pain on deep inspiration.  Chest CT showed large pericardial effusion with small RV dimensions. Bedside echo also showed large pericardial effusion with partial RV collapse. Emergent pericardiocentesis on 02/09/2020, had 1100 ml sanguinous fluid removed. Culture showed no growth. 
  
Seen in ER 02/05/2020 with chest pain (worse with deep inspiration), neck pain, & sore throat x 4 days.   Pacemaker check showed proper function without significant arrhythmic episodes. 
  
 S/p AV node ablation 12/23/2019 at outside hospital. 
  
[employer] biventricular pacemaker in 11/2019 by Dr. Imelda Cruz at [facility]. 
  
Echo (11/09/2018): LVEF 55-65%, no RWMA. Reduced RV systolic function. Mod INDER. Mod MR. Mild AR. Mild TR.   
  
Mother-in-law of Dr. Draper [place]. 
  
 
Problem List  Date Reviewed: 2/13/2020 Codes Class Noted Pericardial effusion ICD-10-CM: I31.3 ICD-9-CM: 423.9  2/9/2020 A-fib [facility]) ICD-10-CM: I48.91 
ICD-9-CM: 427.31  Unknown Acquired hypothyroidism ICD-10-CM: E03.9 ICD-9-CM: 244.9  7/12/2017 Controlled type 2 diabetes mellitus without complication (HCC) IJZ-16-OQ: E11.9 ICD-9-CM: 250.00  7/12/2017 Neck pain on right side ICD-10-CM: M54.2 ICD-9-CM: 723.1  1/3/2014 Polymyalgia rheumatica (HCC) ICD-10-CM: M35.3 ICD-9-CM: 518  1/3/2014 Thyroid nodule ICD-10-CM: E04.1 ICD-9-CM: 241.0  1/3/2014 Hypertension ICD-10-CM: I10 
ICD-9-CM: 401.9  8/14/2009 Overview Signed 7/12/2017  8:40 AM by Marybeth Gregory Overview:  
  
  
   
  
 
 
Current Outpatient Medications Medication Sig Dispense Refill  furosemide (LASIX) 20 mg tablet Take  by mouth daily as needed.  colchicine (MITIGARE) 0.6 mg capsule Take 1 Cap by mouth daily. 30 Cap 2  carvediloL (COREG) 6.25 mg tablet Take 1 Tab by mouth two (2) times daily (with meals). 60 Tab 0  ibuprofen (MOTRIN) 400 mg tablet Take 1 Tab by mouth three (3) times daily. 45 Tab 0  
 pantoprazole (PROTONIX) 20 mg tablet  digestive enzymes cap Take  by mouth.  mecobalamin, vitamin B12, 1,000 mcg TbDi Place under the tongue.  levothyroxine (SYNTHROID) 100 mcg tablet Take 100 mcg by mouth Daily (before breakfast).  VIT C/E/ZN/COPPR/LUTEIN/ZEAXAN (PRESERVISION AREDS 2 PO) Take  by mouth.  cholecalciferol (VITAMIN D3) 1,000 unit cap Take 1,000 Units by mouth daily.  glucosamine (GLUCOSAMINE RELIEF) 1,000 mg tab Take  by mouth.  WITH CHONDROINTIN 1500MG DAILY Allergies Allergen Reactions  Ciprofloxacin Myalgia  Quinolones Other (comments) EYES CAN'T FOCUS, PATIENT CANT WALK Past Medical History:  
Diagnosis Date  A-fib ([facility]  Arthritis OA  Diabetes ([facility] PRE DIABETIC  
 GERD (gastroesophageal reflux disease)  Hypertension  Ill-defined condition MACULAR DEGENERATION  
 Polymyalgia ([facility]  Thyroid disease Past Surgical History:  
Procedure Laterality Date  HX APPENDECTOMY  HX BLEPHAROPLASTY  HX CATARACT REMOVAL Bilateral   
 HX DILATION AND CURETTAGE    
 HX ORTHOPAEDIC    
 RIGHT AND LEFT ARTHROSCOPIC SURGERY  
 HX ORTHOPAEDIC    
 RIGHT AND LEFT KNEE PARTIAL REPLACEMENTS JOINTS Family History Problem Relation Age of Onset  Stroke Mother  Diabetes Mother  Migraines Mother  Cancer Father PROSTATE CANCER AND COLON CANCER  Liver Disease Father HAD BLOOD TRANSFUSION,  OF CIRRHOSIS OF LIVER, WAS NON DRINKER  
 Diabetes Brother  Anesth Problems Neg Hx Social History Tobacco Use  Smoking status: Former Smoker  Smokeless tobacco: Never Used Substance Use Topics  Alcohol use: Yes Alcohol/week: 1.0 standard drinks Types: 1 Glasses of wine per week Comment: RARE Review of Systems:  
Constitutional: Negative for fever, chills, weight loss, + malaise/fatigue. HEENT: Negative for nosebleeds, vision changes. Respiratory: Negative for cough, hemoptysis Cardiovascular: Negative for chest pain, palpitations, orthopnea, claudication, + leg swelling, no syncope, and no PND. Gastrointestinal: Negative for nausea, vomiting, diarrhea, blood in stool and melena. Genitourinary: Negative for dysuria, and hematuria. Musculoskeletal: + bilat lower extremity weakness. + neck, shoulder, chest discomfort Skin: Negative for rash. Heme: Does not bleed or bruise easily. Neurological: Negative for speech change and focal weakness. Objective:  
 
Visit Vitals /88 (BP 1 Location: Left arm, BP Patient Position: Sitting) Pulse 84 Resp 14 Ht 5' 5\" (1.651 m) Wt 152 lb (68.9 kg) BMI 25.29 kg/m² Physical Exam:  
Constitutional: Well-developed and well-nourished. No respiratory distress. Head: Normocephalic and atraumatic. Eyes: Pupils are equal, round ENT: Hearing normal 
Neck: Supple. No JVD present. Cardiovascular: Normal rate, regular rhythm. Exam reveals no gallop and no friction rub. No murmur heard. Pulmonary/Chest: Effort normal and breath sounds decreased in left base. No wheezes. Abdominal: Soft, no tenderness. Musculoskeletal: 2+ bilat lower extremity pitting edema. Neurological: Alert,oriented. Skin: Skin is warm and dry. Left chest pacemaker site well healed. Psychiatric: Normal mood and affect. Behavior is normal. Judgment and thought content normal.   
 
 
Assessment/Plan: ICD-10-CM ICD-9-CM 1. Pericardial effusion I31.3 423.9 2. Persistent atrial fibrillation I48.19 427.31   
3. Essential hypertension I10 401.9 4. Edema, unspecified type R60.9 782.3 Ms. Kathy Marshall was recently admitted with pericardial effusion, required emergent pericardiocentesis. Echo today shows no residual pericardial effusion, but she persists with neck/shoulder/chest pain as prior to admission. Recommend continuing ibuprofen, adding colchicine 0.6 mg po daily. If she experiences GI upset, would reduce colchicine to every other day. Lower extremity edema likely in part due to lack of ambulation over several days, leg weakness may be due to inactivity during recent hospitalization. Advise ambulation with assist for stability until she regains strength. Chest CT during admission had shown possible RV lead perforation at apex. If symptoms resolve, would not intervene on lead.   However, if symptoms persist beyond 2-4 weeks, would advise RV lead repositioning. Continue to hold [address] Follow-up and Dispositions · Return in about 6 months (around 8/13/2020), or if symptoms worsen or fail to improve. Future Appointments Date Time Provider Charlie Nicholson 2/14/2020  9:30 AM ECHO, [address] [facility]  
2/14/2020 10:00 AM Eric Manzano  [address] Thank you for involving me in this patient's care and please call with further concerns or questions. Rebecca Ordoñez M.D. Electrophysiology/Cardiology [facility] [place] [address], [place] [address] , [place]eb[place] Virk [address] [place] [address] 
[phone]                                        [phone]

## 2020-02-13 NOTE — PROGRESS NOTES
Cardiac Electrophysiology OFFICE Note     Subjective:      Steven Horton is a 78 y.o. patient who is seen for follow up of pericardial effusion. Dr Jonathon Mcclain mother in law  He asked me to see her today  Dr Emma Claudio had taken care of her in hospital and I had seen her in clinic before  She is here with her dauhter    Discharged yesterday after admission at [facility] for pericardial effusion & partial RV collapse. Required emergent pericardiocentesis on 02/12/2020. No growth to date on culture. Since discharge, she has used ibuprofen as advised,  mg. In the past day, has had recurrent onset of neck/shoulder/chest discomfort as well as lower extremity edema & bilateral lower extremity weakness. Pain is not worse with inspiration. She was instructed by Dr. Cherylene Figures to take half dose diuretic, had good urine output but swelling persists. Preliminary review of echo today shows no pericardial effusion.     Xarelto held during admission & since discharge.       Previous:  Echo (02/09/2020): LVEF 50-55%, mild concentric LVH. Mod dilated LA. Severely dilated RA. Trace MR. Trace AR. Trace TR. Mod pericardial effusion without indication of tamponade. Compared to pre pericardiocentesis, fluid significantly reduced & now not circumferential but limited to posterior of LV. Few strands of fibrin noted, loculation may not be completely excluded. Admitted 02/08/2020-02/12/2020 at [facility] with 2 week history of progressive lethargy, weakness, poor appetite, n/v, & febrile sensation, then increasing tightness in upper chest with pain on deep inspiration.  Chest CT showed large pericardial effusion with small RV dimensions. Bedside echo also showed large pericardial effusion with partial RV collapse. Emergent pericardiocentesis on 02/09/2020, had 1100 ml sanguinous fluid removed. Culture showed no growth.     Seen in ER 02/05/2020 with chest pain (worse with deep inspiration), neck pain, & sore throat x 4 days. Pacemaker check showed proper function without significant arrhythmic episodes.     S/p AV node ablation 12/23/2019 at outside hospital.     [employer]tr[employer] biventricular pacemaker in 11/2019 by Dr. Jesus Alberto Villalobos at [facility].     Echo (11/09/2018): LVEF 55-65%, no RWMA. Reduced RV systolic function. Mod INDER. Mod MR. Mild AR. Mild TR.         Problem List  Date Reviewed: 2/13/2020          Codes Class Noted    Pericardial effusion ICD-10-CM: I31.3  ICD-9-CM: 423.9  2/9/2020        A-fib ([facility] ICD-10-CM: I48.91  ICD-9-CM: 427.31  Unknown        Acquired hypothyroidism ICD-10-CM: E03.9  ICD-9-CM: 244.9  7/12/2017        Controlled type 2 diabetes mellitus without complication ([facility] JAT-20-TX: E11.9  ICD-9-CM: 250.00  7/12/2017        Neck pain on right side ICD-10-CM: M54.2  ICD-9-CM: 723.1  1/3/2014        Polymyalgia rheumatica ([facility] ICD-10-CM: M35.3  ICD-9-CM: 409  1/3/2014        Thyroid nodule ICD-10-CM: E04.1  ICD-9-CM: 241.0  1/3/2014        Hypertension ICD-10-CM: I10  ICD-9-CM: 401.9  8/14/2009    Overview Signed 7/12/2017  8:40 AM by Jus MAXWELL     Overview:                    Current Outpatient Medications   Medication Sig Dispense Refill    furosemide (LASIX) 20 mg tablet Take  by mouth daily as needed.  colchicine (MITIGARE) 0.6 mg capsule Take 1 Cap by mouth daily. 30 Cap 2    carvediloL (COREG) 6.25 mg tablet Take 1 Tab by mouth two (2) times daily (with meals). 60 Tab 0    ibuprofen (MOTRIN) 400 mg tablet Take 1 Tab by mouth three (3) times daily. 45 Tab 0    pantoprazole (PROTONIX) 20 mg tablet       digestive enzymes cap Take  by mouth.  mecobalamin, vitamin B12, 1,000 mcg TbDi Place under the tongue.  levothyroxine (SYNTHROID) 100 mcg tablet Take 100 mcg by mouth Daily (before breakfast).  VIT C/E/ZN/COPPR/LUTEIN/ZEAXAN (PRESERVISION AREDS 2 PO) Take  by mouth.  cholecalciferol (VITAMIN D3) 1,000 unit cap Take 1,000 Units by mouth daily.       glucosamine (GLUCOSAMINE RELIEF) 1,000 mg tab Take  by mouth. WITH CHONDROINTIN 1500MG DAILY       Allergies   Allergen Reactions    Ciprofloxacin Myalgia    Quinolones Other (comments)     EYES CAN'T FOCUS, PATIENT CANT WALK      Past Medical History:   Diagnosis Date    [facility] ([facility]     Arthritis     OA    Diabetes ([facility]     PRE DIABETIC    GERD (gastroesophageal reflux disease)     Hypertension     Ill-defined condition     MACULAR DEGENERATION    Polymyalgia ([facility]     Thyroid disease      Past Surgical History:   Procedure Laterality Date    HX APPENDECTOMY      HX BLEPHAROPLASTY      HX CATARACT REMOVAL Bilateral     HX DILATION AND CURETTAGE      HX ORTHOPAEDIC      RIGHT AND LEFT ARTHROSCOPIC SURGERY    HX ORTHOPAEDIC      RIGHT AND LEFT KNEE PARTIAL REPLACEMENTS JOINTS     Family History   Problem Relation Age of Onset    Stroke Mother     Diabetes Mother     Migraines Mother     Cancer Father         PROSTATE CANCER AND COLON CANCER    Liver Disease Father         HAD BLOOD TRANSFUSION,  OF CIRRHOSIS OF LIVER, WAS NON DRINKER    Diabetes Brother     Anesth Problems Neg Hx      Social History     Tobacco Use    Smoking status: Former Smoker    Smokeless tobacco: Never Used   Substance Use Topics    Alcohol use: Yes     Alcohol/week: 1.0 standard drinks     Types: 1 Glasses of wine per week     Comment: RARE        Review of Systems:   Constitutional: Negative for fever, chills, weight loss, + malaise/fatigue. HEENT: Negative for nosebleeds, vision changes. Respiratory: Negative for cough, hemoptysis  Cardiovascular: Negative for chest pain, palpitations, orthopnea, claudication, + leg swelling, no syncope, and no PND. Gastrointestinal: Negative for nausea, vomiting, diarrhea, blood in stool and melena. Genitourinary: Negative for dysuria, and hematuria. Musculoskeletal: + bilat lower extremity weakness. + neck, shoulder, chest discomfort  Skin: Negative for rash.    Heme: Does not bleed or bruise easily. Neurological: Negative for speech change and focal weakness. Objective:     Visit Vitals  /88 (BP 1 Location: Left arm, BP Patient Position: Sitting)   Pulse 84   Resp 14   Ht 5' 5\" (1.651 m)   Wt 152 lb (68.9 kg)   BMI 25.29 kg/m²      Physical Exam:   Constitutional: Well-developed and well-nourished. No respiratory distress. Head: Normocephalic and atraumatic. Eyes: Pupils are equal, round  ENT: Hearing normal  Neck: Supple. No JVD present. Cardiovascular: Normal rate, regular rhythm. Exam reveals no gallop and no friction rub. No murmur heard. Pulmonary/Chest: Effort normal and breath sounds decreased in left base. No wheezes. Abdominal: Soft, no tenderness. Musculoskeletal: 3+ bilat lower extremity pitting edema. Neurological: Alert,oriented. Skin: Skin is warm and dry. Left chest pacemaker site well healed. Psychiatric: Normal mood and affect. Behavior is normal. Judgment and thought content normal.        Assessment/Plan:        ICD-10-CM ICD-9-CM    1. Pericardial effusion I31.3 423.9    2. Persistent atrial fibrillation I48.19 427.31    3. Essential hypertension I10 401.9    4. Bilateral leg edema R60.0 782.3    5. WILKINS (dyspnea on exertion) R06.09 786.09    6. 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 Biventricular cardiac pacemaker in situ Z95.0 V45.01    8. Neck pain M54.2 723.1      Ms. Janel Berrios was recently admitted with pericardial effusion, required emergent pericardiocentesis. Echo today shows no residual pericardial effusion, but she persists with neck/shoulder/chest pain as prior to admission. Recommend continuing ibuprofen, adding colchicine 0.6 mg po daily. If she experiences GI upset, would reduce colchicine to every other day.   I hope she can wean off NSAIDS    Lower extremity edema likely in part due to lack of ambulation over several days and she has not ambulated while in CCU with pericardial drain  Leg weakness may be due to inactivity during recent hospitalization. Advise ambulation with assist for stability until she regains strength. Chest CT during admission  if symptoms persist beyond 2-4 weeks, would advise RV lead repositioning. Currently it is at [place]    Continue to hold [address] [employer]. Follow-up and Dispositions    · Return in about 6 months (around 8/13/2020), or if symptoms worsen or fail to improve. Future Appointments   Date Time Provider Charlie Nicholson   2/14/2020  9:30 AM ECHO, [address]   2/14/2020 10:00 AM Watson Stephens  [address]     Thank you for involving me in this patient's care and please call with further concerns or questions. Anthony Diez M.D.   Electrophysiology/Cardiology  [facility] [place]  [address], [place] [address], [facility]  [address], [address]                             [address] [place] [address]  [phone]

## 2020-02-17 NOTE — TELEPHONE ENCOUNTER
Spoke to patient's daughter, Brian Hayden. Name noted on permission to release information. Patient seen by Dr. Ángel Dorado on 2-13-20.

## 2020-03-25 LAB
ACID FAST STN SPEC: NEGATIVE
MYCOBACTERIUM SPEC QL CULT: POSITIVE
SPECIMEN PREPARATION: ABNORMAL
SPECIMEN SOURCE: ABNORMAL

## 2022-03-19 PROBLEM — I31.39 PERICARDIAL EFFUSION: Status: ACTIVE | Noted: 2020-02-09

## 2022-03-19 PROBLEM — E11.9 CONTROLLED TYPE 2 DIABETES MELLITUS WITHOUT COMPLICATION (HCC): Status: ACTIVE | Noted: 2017-07-12

## 2022-03-20 PROBLEM — E03.9 ACQUIRED HYPOTHYROIDISM: Status: ACTIVE | Noted: 2017-07-12

## 2022-07-14 NOTE — ED NOTES
Spoke with ELIZABETH Joseph in regards to increasing lactic acid. Will be down shortly. Ndc (75mg/0.83ml Syringe): [address]-01 Ndc (75mg/0.83ml Syringe): [address]-49

## (undated) DEVICE — MEDI-VAC NON-CONDUCTIVE SUCTION TUBING: Brand: CARDINAL HEALTH

## (undated) DEVICE — SPECIAL PROCEDURE DRAPE 32" X 34": Brand: SPECIAL PROCEDURE DRAPE

## (undated) DEVICE — REM POLYHESIVE ADULT PATIENT RETURN ELECTRODE: Brand: VALLEYLAB

## (undated) DEVICE — CLIP INT SM WIDE RED TI TRNSVRS GRV CHEVRON SHP W/ PRECIS

## (undated) DEVICE — Device

## (undated) DEVICE — ANGIOGRAPHY KIT

## (undated) DEVICE — STERILE POLYISOPRENE POWDER-FREE SURGICAL GLOVES WITH EMOLLIENT COATING: Brand: PROTEXIS

## (undated) DEVICE — HANDLE LT SNAP ON ULT DURABLE LENS FOR TRUMPF ALC DISPOSABLE

## (undated) DEVICE — KIT MFLD ISOLATN NACL CNTRST PRT TBNG SPIK W/ PRSS TRNSDUC

## (undated) DEVICE — ELECTRODE NDL 2.8IN COAT VALLEYLAB

## (undated) DEVICE — DRAPE PRB US TRNSDCR 6X96IN --

## (undated) DEVICE — KIT MED IMAG CNTRST AGNT W/ IOPAMIDOL REUSE

## (undated) DEVICE — SUT SLK 2-0SH 30IN BLK --

## (undated) DEVICE — SUTURE MCRYL SZ 4-0 L18IN ABSRB UD P-3 L13MM 3/8 CIR PRIM Y494G

## (undated) DEVICE — Z DISCONTINUEDSOLUTION PREP 2OZ 10% POVIDONE IOD SCR CAP BTL

## (undated) DEVICE — AEGIS 1" DISK 4MM HOLE, PEEL OPEN: Brand: MEDLINE

## (undated) DEVICE — DERMABOND SKIN ADH 0.7ML -- DERMABOND ADVANCED 12/BX

## (undated) DEVICE — SYR 50ML LR LCK 1ML GRAD NSAF --

## (undated) DEVICE — KIT HND CTRL 3 W STPCOCK ROT END 54IN PREM HI PRSS TBNG AT

## (undated) DEVICE — PACK PROCEDURE SURG HRT CATH

## (undated) DEVICE — DRAPE,REIN 53X77,STERILE: Brand: MEDLINE

## (undated) DEVICE — SOLUTION IV 1000ML 0.9% SOD CHL

## (undated) DEVICE — Z INACTIVE NO USAGE TURNOVER KIT RM CLEANOP

## (undated) DEVICE — DEVON™ KNEE AND BODY STRAP 60" X 3" (1.5 M X 7.6 CM): Brand: DEVON

## (undated) DEVICE — (D)SYR 10ML 1/5ML GRAD NSAF -- PKGING CHANGE USE ITEM 338027

## (undated) DEVICE — MICROPUNCTURE INTRODUCER SET SILHOUETTE TRANSITIONLESS WITH STAINLESS STEEL WIRE GUIDE: Brand: MICROPUNCTURE